# Patient Record
Sex: MALE | Race: WHITE | NOT HISPANIC OR LATINO | Employment: FULL TIME | URBAN - METROPOLITAN AREA
[De-identification: names, ages, dates, MRNs, and addresses within clinical notes are randomized per-mention and may not be internally consistent; named-entity substitution may affect disease eponyms.]

---

## 2017-02-08 ENCOUNTER — HISTORICAL (OUTPATIENT)
Dept: LAB | Facility: HOSPITAL | Age: 47
End: 2017-02-08

## 2018-04-09 ENCOUNTER — HISTORICAL (OUTPATIENT)
Dept: ADMINISTRATIVE | Facility: HOSPITAL | Age: 48
End: 2018-04-09

## 2018-04-09 ENCOUNTER — HISTORICAL (OUTPATIENT)
Dept: LAB | Facility: HOSPITAL | Age: 48
End: 2018-04-09

## 2018-04-09 LAB
ALBUMIN SERPL-MCNC: 4.8 GM/DL (ref 3.4–5)
ALBUMIN/GLOB SERPL: 2.4 {RATIO} (ref 1.5–2.5)
ALP SERPL-CCNC: 62 UNIT/L (ref 38–126)
ALT SERPL-CCNC: 24 UNIT/L (ref 7–52)
APPEARANCE, UA: CLEAR
AST SERPL-CCNC: 29 UNIT/L (ref 15–37)
BACTERIA #/AREA URNS AUTO: NORMAL /HPF
BILIRUB SERPL-MCNC: 0.4 MG/DL (ref 0.2–1)
BILIRUB UR QL STRIP: NEGATIVE MG/DL
BILIRUBIN DIRECT+TOT PNL SERPL-MCNC: 0.1 MG/DL (ref 0–0.5)
BUN SERPL-MCNC: 22 MG/DL (ref 7–18)
CALCIUM SERPL-MCNC: 9.4 MG/DL (ref 8.5–10)
CHLORIDE SERPL-SCNC: 103 MMOL/L (ref 98–107)
CHOLEST SERPL-MCNC: 118 MG/DL (ref 0–200)
CHOLEST/HDLC SERPL: 2.5 {RATIO}
CO2 SERPL-SCNC: 27 MMOL/L (ref 21–32)
COLOR UR: YELLOW
CREAT SERPL-MCNC: 0.99 MG/DL (ref 0.6–1.3)
CREAT/UREA NIT SERPL: 22.2
GGT SERPL-CCNC: 13 UNIT/L (ref 5–85)
GLOBULIN SER-MCNC: 2 GM/DL (ref 1.2–3)
GLUCOSE (UA): NEGATIVE MG/DL
GLUCOSE SERPL-MCNC: 98 MG/DL (ref 74–106)
HDLC SERPL-MCNC: 47 MG/DL (ref 35–60)
HGB UR QL STRIP: NEGATIVE UNIT/L
KETONES UR QL STRIP: NEGATIVE MG/DL
LDH SERPL-CCNC: 155 UNIT/L (ref 140–271)
LDLC SERPL CALC-MCNC: 61 MG/DL (ref 0–129)
LEUKOCYTE ESTERASE UR QL STRIP: NEGATIVE UNIT/L
NITRITE UR QL STRIP.AUTO: NEGATIVE
PH UR STRIP: 6.5 [PH]
POTASSIUM SERPL-SCNC: 4.6 MMOL/L (ref 3.5–5.1)
PROT SERPL-MCNC: 6.8 GM/DL (ref 6.4–8.2)
PROT UR QL STRIP: NEGATIVE MG/DL
RBC #/AREA URNS HPF: NORMAL /HPF
SODIUM SERPL-SCNC: 139 MMOL/L (ref 136–145)
SP GR UR STRIP: 1.01
SQUAMOUS EPITHELIAL, UA: NORMAL /LPF
TESTOST SERPL-MCNC: >1009 NG/DL (ref 300–1060)
TRIGL SERPL-MCNC: 44 MG/DL (ref 30–150)
UROBILINOGEN UR STRIP-ACNC: 0.2 MG/DL
VLDLC SERPL CALC-MCNC: 8.8 MG/DL
WBC #/AREA URNS AUTO: NORMAL /[HPF]

## 2018-10-04 ENCOUNTER — HISTORICAL (OUTPATIENT)
Dept: ADMINISTRATIVE | Facility: HOSPITAL | Age: 48
End: 2018-10-04

## 2018-10-04 LAB
ABS NEUT (OLG): 4.2
ERYTHROCYTE [DISTWIDTH] IN BLOOD BY AUTOMATED COUNT: 12.3 % (ref 11.5–17)
HCT VFR BLD AUTO: 48.3 % (ref 42–52)
HGB BLD-MCNC: 16 GM/DL (ref 14–18)
LYMPHOCYTES # BLD AUTO: 1.5 X10(3)/MCL (ref 0.6–3.4)
LYMPHOCYTES NFR BLD AUTO: 24.1 % (ref 13–40)
MCH RBC QN AUTO: 31.8 PG (ref 27–31.2)
MCHC RBC AUTO-ENTMCNC: 33 GM/DL (ref 32–36)
MCV RBC AUTO: 96 FL (ref 80–94)
MONOCYTES # BLD AUTO: 0.6 X10(3)/MCL (ref 0–1.8)
MONOCYTES NFR BLD AUTO: 9.1 % (ref 0.1–24)
NEUTROPHILS NFR BLD AUTO: 66.8 % (ref 47–80)
PLATELET # BLD AUTO: 167 X10(3)/MCL (ref 130–400)
PMV BLD AUTO: 10.1 FL
PSA SERPL-MCNC: 1.67 NG/ML (ref 0–2.5)
RBC # BLD AUTO: 5.03 X10(6)/MCL (ref 4.7–6.1)
WBC # SPEC AUTO: 6.3 X10(3)/MCL (ref 4.5–11.5)

## 2019-05-09 ENCOUNTER — HISTORICAL (OUTPATIENT)
Dept: LAB | Facility: HOSPITAL | Age: 49
End: 2019-05-09

## 2019-05-09 ENCOUNTER — HISTORICAL (OUTPATIENT)
Dept: ADMINISTRATIVE | Facility: HOSPITAL | Age: 49
End: 2019-05-09

## 2019-05-09 LAB
ABS NEUT (OLG): 2.9 X10(3)/MCL (ref 2.1–9.2)
ALBUMIN SERPL-MCNC: 4.4 GM/DL (ref 3.4–5)
ALBUMIN/GLOB SERPL: 1.83 {RATIO} (ref 1.5–2.5)
ALP SERPL-CCNC: 57 UNIT/L (ref 38–126)
ALT SERPL-CCNC: 26 UNIT/L (ref 7–52)
APPEARANCE, UA: CLEAR
AST SERPL-CCNC: 37 UNIT/L (ref 15–37)
BACTERIA #/AREA URNS AUTO: NORMAL /HPF
BILIRUB SERPL-MCNC: 0.3 MG/DL (ref 0.2–1)
BILIRUB UR QL STRIP: NEGATIVE MG/DL
BILIRUBIN DIRECT+TOT PNL SERPL-MCNC: 0.1 MG/DL (ref 0–0.5)
BILIRUBIN DIRECT+TOT PNL SERPL-MCNC: 0.2 MG/DL
BUN SERPL-MCNC: 21 MG/DL (ref 7–18)
CALCIUM SERPL-MCNC: 8.9 MG/DL (ref 8.5–10)
CHLORIDE SERPL-SCNC: 103 MMOL/L (ref 98–107)
CHOLEST SERPL-MCNC: 116 MG/DL (ref 0–200)
CHOLEST/HDLC SERPL: 2.3 {RATIO}
CO2 SERPL-SCNC: 30 MMOL/L (ref 21–32)
COLOR UR: YELLOW
CREAT SERPL-MCNC: 1 MG/DL (ref 0.6–1.3)
ERYTHROCYTE [DISTWIDTH] IN BLOOD BY AUTOMATED COUNT: 12.5 % (ref 11.5–17)
GLOBULIN SER-MCNC: 2.4 GM/DL (ref 1.2–3)
GLUCOSE (UA): NEGATIVE MG/DL
GLUCOSE SERPL-MCNC: 102 MG/DL (ref 74–106)
HCT VFR BLD AUTO: 42.8 % (ref 42–52)
HDLC SERPL-MCNC: 50 MG/DL (ref 35–60)
HGB BLD-MCNC: 14.8 GM/DL (ref 14–18)
HGB UR QL STRIP: NEGATIVE UNIT/L
KETONES UR QL STRIP: NEGATIVE MG/DL
LDLC SERPL CALC-MCNC: 52 MG/DL (ref 0–129)
LEUKOCYTE ESTERASE UR QL STRIP: NEGATIVE UNIT/L
LYMPHOCYTES # BLD AUTO: 1.2 X10(3)/MCL (ref 0.6–3.4)
LYMPHOCYTES NFR BLD AUTO: 26.9 % (ref 13–40)
MCH RBC QN AUTO: 31.3 PG (ref 27–31.2)
MCHC RBC AUTO-ENTMCNC: 35 GM/DL (ref 32–36)
MCV RBC AUTO: 90 FL (ref 80–94)
MONOCYTES # BLD AUTO: 0.5 X10(3)/MCL (ref 0.1–1.3)
MONOCYTES NFR BLD AUTO: 10.4 % (ref 0.1–24)
NEUTROPHILS NFR BLD AUTO: 62.7 % (ref 47–80)
NITRITE UR QL STRIP.AUTO: NEGATIVE
PH UR STRIP: 7 [PH]
PLATELET # BLD AUTO: 169 X10(3)/MCL (ref 130–400)
PMV BLD AUTO: 8.9 FL (ref 9.4–12.4)
POTASSIUM SERPL-SCNC: 4.6 MMOL/L (ref 3.5–5.1)
PROT SERPL-MCNC: 6.8 GM/DL (ref 6.4–8.2)
PROT UR QL STRIP: NEGATIVE MG/DL
PSA SERPL-MCNC: 1.76 NG/ML (ref 0–2.5)
RBC # BLD AUTO: 4.73 X10(6)/MCL (ref 4.7–6.1)
RBC #/AREA URNS HPF: NORMAL /HPF
SODIUM SERPL-SCNC: 137 MMOL/L (ref 136–145)
SP GR UR STRIP: 1.01
SQUAMOUS EPITHELIAL, UA: NORMAL /LPF
TESTOST SERPL-MCNC: 697 NG/DL (ref 300–1060)
TRIGL SERPL-MCNC: 38 MG/DL (ref 30–150)
UROBILINOGEN UR STRIP-ACNC: 0.2 MG/DL
VLDLC SERPL CALC-MCNC: 7.6 MG/DL
WBC # SPEC AUTO: 4.6 X10(3)/MCL (ref 4.5–11.5)
WBC #/AREA URNS AUTO: NORMAL /[HPF]

## 2020-02-13 ENCOUNTER — HISTORICAL (OUTPATIENT)
Dept: LAB | Facility: HOSPITAL | Age: 50
End: 2020-02-13

## 2020-02-13 ENCOUNTER — HISTORICAL (OUTPATIENT)
Dept: ADMINISTRATIVE | Facility: HOSPITAL | Age: 50
End: 2020-02-13

## 2020-02-13 LAB
ABS NEUT (OLG): 2.6 X10(3)/MCL (ref 2.1–9.2)
ERYTHROCYTE [DISTWIDTH] IN BLOOD BY AUTOMATED COUNT: 12.8 % (ref 11.5–17)
ESTRADIOL SERPL HS-MCNC: 28 PG/ML
HCT VFR BLD AUTO: 46.8 % (ref 42–52)
HGB BLD-MCNC: 15.8 GM/DL (ref 14–18)
LYMPHOCYTES # BLD AUTO: 1.1 X10(3)/MCL (ref 0.6–3.4)
LYMPHOCYTES NFR BLD AUTO: 23.5 % (ref 13–40)
MCH RBC QN AUTO: 30.7 PG (ref 27–31.2)
MCHC RBC AUTO-ENTMCNC: 34 GM/DL (ref 32–36)
MCV RBC AUTO: 91 FL (ref 80–94)
MONOCYTES # BLD AUTO: 0.8 X10(3)/MCL (ref 0.1–1.3)
MONOCYTES NFR BLD AUTO: 17.2 % (ref 0.1–24)
NEUTROPHILS NFR BLD AUTO: 59.3 % (ref 47–80)
PLATELET # BLD AUTO: 192 X10(3)/MCL (ref 130–400)
PMV BLD AUTO: 9.6 FL (ref 9.4–12.4)
PSA SERPL-MCNC: 2.05 NG/ML (ref 0–2.5)
RBC # BLD AUTO: 5.14 X10(6)/MCL (ref 4.7–6.1)
WBC # SPEC AUTO: 4.5 X10(3)/MCL (ref 4.5–11.5)

## 2020-05-21 ENCOUNTER — HISTORICAL (OUTPATIENT)
Dept: ADMINISTRATIVE | Facility: HOSPITAL | Age: 50
End: 2020-05-21

## 2020-05-21 LAB
ABS NEUT (OLG): 2.3 X10(3)/MCL (ref 2.1–9.2)
ALBUMIN SERPL-MCNC: 4.4 GM/DL (ref 3.4–5)
ALBUMIN/GLOB SERPL: 1.83 {RATIO} (ref 1.5–2.5)
ALP SERPL-CCNC: 54 UNIT/L (ref 38–126)
ALT SERPL-CCNC: 32 UNIT/L (ref 7–52)
APPEARANCE, UA: CLEAR
AST SERPL-CCNC: 32 UNIT/L (ref 15–37)
BACTERIA #/AREA URNS AUTO: NORMAL /HPF
BILIRUB SERPL-MCNC: 0.4 MG/DL (ref 0.2–1)
BILIRUB UR QL STRIP: NEGATIVE MG/DL
BILIRUBIN DIRECT+TOT PNL SERPL-MCNC: 0.1 MG/DL (ref 0–0.5)
BILIRUBIN DIRECT+TOT PNL SERPL-MCNC: 0.3 MG/DL
BUN SERPL-MCNC: 23 MG/DL (ref 7–18)
CALCIUM SERPL-MCNC: 9.2 MG/DL (ref 8.5–10)
CHLORIDE SERPL-SCNC: 103 MMOL/L (ref 98–107)
CHOLEST SERPL-MCNC: 136 MG/DL (ref 0–200)
CHOLEST/HDLC SERPL: 4 {RATIO}
CO2 SERPL-SCNC: 29 MMOL/L (ref 21–32)
COLOR UR: YELLOW
CREAT SERPL-MCNC: 1.18 MG/DL (ref 0.6–1.3)
ERYTHROCYTE [DISTWIDTH] IN BLOOD BY AUTOMATED COUNT: 12.4 % (ref 11.5–17)
ESTRADIOL SERPL HS-MCNC: <24 PG/ML
GLOBULIN SER-MCNC: 2.4 GM/DL (ref 1.2–3)
GLUCOSE (UA): NEGATIVE MG/DL
GLUCOSE SERPL-MCNC: 93 MG/DL (ref 74–106)
HCT VFR BLD AUTO: 47.1 % (ref 42–52)
HDLC SERPL-MCNC: 34 MG/DL (ref 35–60)
HGB BLD-MCNC: 15.8 GM/DL (ref 14–18)
HGB UR QL STRIP: NEGATIVE UNIT/L
KETONES UR QL STRIP: NEGATIVE MG/DL
LDLC SERPL CALC-MCNC: 99 MG/DL (ref 0–129)
LEUKOCYTE ESTERASE UR QL STRIP: NEGATIVE UNIT/L
LYMPHOCYTES # BLD AUTO: 1.3 X10(3)/MCL (ref 0.6–3.4)
LYMPHOCYTES NFR BLD AUTO: 31.2 % (ref 13–40)
MCH RBC QN AUTO: 30.2 PG (ref 27–31.2)
MCHC RBC AUTO-ENTMCNC: 34 GM/DL (ref 32–36)
MCV RBC AUTO: 90 FL (ref 80–94)
MONOCYTES # BLD AUTO: 0.6 X10(3)/MCL (ref 0.1–1.3)
MONOCYTES NFR BLD AUTO: 14.7 % (ref 0.1–24)
NEUTROPHILS NFR BLD AUTO: 54.1 % (ref 47–80)
NITRITE UR QL STRIP.AUTO: NEGATIVE
PH UR STRIP: 7.5 [PH]
PLATELET # BLD AUTO: 227 X10(3)/MCL (ref 130–400)
PMV BLD AUTO: 10.1 FL (ref 9.4–12.4)
POTASSIUM SERPL-SCNC: 4.7 MMOL/L (ref 3.5–5.1)
PROT SERPL-MCNC: 6.8 GM/DL (ref 6.4–8.2)
PROT UR QL STRIP: NEGATIVE MG/DL
PSA SERPL-MCNC: 3.03 NG/ML (ref 0–2.5)
RBC # BLD AUTO: 5.24 X10(6)/MCL (ref 4.7–6.1)
RBC #/AREA URNS HPF: NORMAL /HPF
SODIUM SERPL-SCNC: 139 MMOL/L (ref 136–145)
SP GR UR STRIP: 1.02
SQUAMOUS EPITHELIAL, UA: NORMAL /LPF
TESTOST SERPL-MCNC: 223 NG/DL (ref 300–1060)
TRIGL SERPL-MCNC: 60 MG/DL (ref 30–150)
UROBILINOGEN UR STRIP-ACNC: 0.2 MG/DL
VLDLC SERPL CALC-MCNC: 12 MG/DL
WBC # SPEC AUTO: 4.2 X10(3)/MCL (ref 4.5–11.5)
WBC #/AREA URNS AUTO: NORMAL /[HPF]

## 2020-09-28 ENCOUNTER — HISTORICAL (OUTPATIENT)
Dept: ADMINISTRATIVE | Facility: HOSPITAL | Age: 50
End: 2020-09-28

## 2020-09-28 LAB — PSA SERPL-MCNC: 2.15 NG/ML (ref 0–2.5)

## 2021-06-09 ENCOUNTER — HISTORICAL (OUTPATIENT)
Dept: ADMINISTRATIVE | Facility: HOSPITAL | Age: 51
End: 2021-06-09

## 2021-06-09 LAB
ABS NEUT (OLG): 2.4 X10(3)/MCL (ref 2.1–9.2)
ALBUMIN SERPL-MCNC: 4.2 GM/DL (ref 3.4–5)
ALBUMIN/GLOB SERPL: 1.75 {RATIO} (ref 1.5–2.5)
ALP SERPL-CCNC: 54 UNIT/L (ref 38–126)
ALT SERPL-CCNC: 48 UNIT/L (ref 7–52)
APPEARANCE, UA: CLEAR
AST SERPL-CCNC: 58 UNIT/L (ref 15–37)
BACTERIA #/AREA URNS AUTO: NORMAL /HPF
BILIRUB SERPL-MCNC: 0.4 MG/DL (ref 0.2–1)
BILIRUB UR QL STRIP: NEGATIVE MG/DL
BILIRUBIN DIRECT+TOT PNL SERPL-MCNC: 0.1 MG/DL (ref 0–0.5)
BILIRUBIN DIRECT+TOT PNL SERPL-MCNC: 0.3 MG/DL
BUN SERPL-MCNC: 27 MG/DL (ref 7–18)
CALCIUM SERPL-MCNC: 8.9 MG/DL (ref 8.5–10.1)
CHLORIDE SERPL-SCNC: 105 MMOL/L (ref 98–107)
CHOLEST SERPL-MCNC: 118 MG/DL (ref 0–200)
CHOLEST/HDLC SERPL: 2.7 {RATIO}
CO2 SERPL-SCNC: 29 MMOL/L (ref 21–32)
COLOR UR: YELLOW
CREAT SERPL-MCNC: 0.99 MG/DL (ref 0.6–1.3)
ERYTHROCYTE [DISTWIDTH] IN BLOOD BY AUTOMATED COUNT: 12.8 % (ref 11.5–17)
GLOBULIN SER-MCNC: 2.1 GM/DL (ref 1.2–3)
GLUCOSE (UA): NEGATIVE MG/DL
GLUCOSE SERPL-MCNC: 92 MG/DL (ref 74–106)
HCT VFR BLD AUTO: 45.4 % (ref 42–52)
HDLC SERPL-MCNC: 44 MG/DL (ref 35–60)
HGB BLD-MCNC: 15.5 GM/DL (ref 14–18)
HGB UR QL STRIP: NEGATIVE UNIT/L
KETONES UR QL STRIP: NORMAL MG/DL
LDLC SERPL CALC-MCNC: 63 MG/DL (ref 0–129)
LEUKOCYTE ESTERASE UR QL STRIP: NEGATIVE UNIT/L
LYMPHOCYTES # BLD AUTO: 1.4 X10(3)/MCL (ref 0.6–3.4)
LYMPHOCYTES NFR BLD AUTO: 29.5 % (ref 13–40)
MCH RBC QN AUTO: 31.2 PG (ref 27–31.2)
MCHC RBC AUTO-ENTMCNC: 34 GM/DL (ref 32–36)
MCV RBC AUTO: 91 FL (ref 80–94)
MONOCYTES # BLD AUTO: 0.8 X10(3)/MCL (ref 0.1–1.3)
MONOCYTES NFR BLD AUTO: 17.3 % (ref 0.1–24)
NEUTROPHILS NFR BLD AUTO: 53.2 % (ref 47–80)
NITRITE UR QL STRIP.AUTO: NEGATIVE
PH UR STRIP: 6 [PH]
PLATELET # BLD AUTO: 161 X10(3)/MCL (ref 130–400)
PMV BLD AUTO: 10.1 FL (ref 9.4–12.4)
POTASSIUM SERPL-SCNC: 4.9 MMOL/L (ref 3.5–5.1)
PROT SERPL-MCNC: 6.6 GM/DL (ref 6.4–8.2)
PROT UR QL STRIP: NEGATIVE MG/DL
PSA SERPL-MCNC: 2.15 NG/ML (ref 0–2.5)
RBC # BLD AUTO: 4.97 X10(6)/MCL (ref 4.7–6.1)
RBC #/AREA URNS HPF: NORMAL /HPF
SODIUM SERPL-SCNC: 141 MMOL/L (ref 136–145)
SP GR UR STRIP: 1.02
SQUAMOUS EPITHELIAL, UA: NORMAL /LPF
TRIGL SERPL-MCNC: 50 MG/DL (ref 30–150)
UROBILINOGEN UR STRIP-ACNC: 0.2 MG/DL
VLDLC SERPL CALC-MCNC: 10 MG/DL
WBC # SPEC AUTO: 4.6 X10(3)/MCL (ref 4.5–11.5)
WBC #/AREA URNS AUTO: NORMAL /[HPF]

## 2021-08-25 ENCOUNTER — HISTORICAL (OUTPATIENT)
Dept: ADMINISTRATIVE | Facility: HOSPITAL | Age: 51
End: 2021-08-25

## 2021-08-25 LAB
ALBUMIN SERPL-MCNC: 4.6 GM/DL (ref 3.4–5)
ALBUMIN/GLOB SERPL: 2 {RATIO} (ref 1.5–2.5)
ALP SERPL-CCNC: 62 UNIT/L (ref 38–126)
ALT SERPL-CCNC: 24 UNIT/L (ref 7–52)
AST SERPL-CCNC: 29 UNIT/L (ref 15–37)
BILIRUB SERPL-MCNC: 0.4 MG/DL (ref 0.2–1)
BILIRUBIN DIRECT+TOT PNL SERPL-MCNC: 0.1 MG/DL (ref 0–0.5)
BILIRUBIN DIRECT+TOT PNL SERPL-MCNC: 0.3 MG/DL
BUN SERPL-MCNC: 24 MG/DL (ref 7–18)
CALCIUM SERPL-MCNC: 9.5 MG/DL (ref 8.5–10.1)
CHLORIDE SERPL-SCNC: 99 MMOL/L (ref 98–107)
CO2 SERPL-SCNC: 30 MMOL/L (ref 21–32)
CREAT SERPL-MCNC: 1.2 MG/DL (ref 0.6–1.3)
EST CREAT CLEARANCE SER (OHS): 83.42 ML/MIN
ESTRADIOL SERPL HS-MCNC: 78 PG/ML
GLOBULIN SER-MCNC: 2.3 GM/DL (ref 1.2–3)
GLUCOSE SERPL-MCNC: 85 MG/DL (ref 74–106)
POTASSIUM SERPL-SCNC: 4.8 MMOL/L (ref 3.5–5.1)
PROT SERPL-MCNC: 6.9 GM/DL (ref 6.4–8.2)
SODIUM SERPL-SCNC: 137 MMOL/L (ref 136–145)
TESTOST SERPL-MCNC: >1009 NG/DL (ref 300–1060)

## 2021-11-08 ENCOUNTER — HISTORICAL (OUTPATIENT)
Dept: ADMINISTRATIVE | Facility: HOSPITAL | Age: 51
End: 2021-11-08

## 2021-11-08 LAB
ABS NEUT (OLG): 4.5 X10(3)/MCL (ref 2.1–9.2)
ERYTHROCYTE [DISTWIDTH] IN BLOOD BY AUTOMATED COUNT: 12.3 % (ref 11.5–17)
ESTRADIOL SERPL HS-MCNC: <24 PG/ML
HCT VFR BLD AUTO: 46.9 % (ref 42–52)
HGB BLD-MCNC: 15.7 GM/DL (ref 14–18)
LYMPHOCYTES # BLD AUTO: 1.2 X10(3)/MCL (ref 0.6–3.4)
LYMPHOCYTES NFR BLD AUTO: 18.7 % (ref 13–40)
MCH RBC QN AUTO: 30.4 PG (ref 27–31.2)
MCHC RBC AUTO-ENTMCNC: 34 GM/DL (ref 32–36)
MCV RBC AUTO: 91 FL (ref 80–94)
MONOCYTES # BLD AUTO: 0.6 X10(3)/MCL (ref 0.1–1.3)
MONOCYTES NFR BLD AUTO: 9.9 % (ref 0.1–24)
NEUTROPHILS NFR BLD AUTO: 71.4 % (ref 47–80)
PLATELET # BLD AUTO: 217 X10(3)/MCL (ref 130–400)
PMV BLD AUTO: 9.7 FL (ref 9.4–12.4)
PSA SERPL-MCNC: 2.59 NG/ML (ref 0–2.5)
RBC # BLD AUTO: 5.17 X10(6)/MCL (ref 4.7–6.1)
TESTOST SERPL-MCNC: 620 NG/DL (ref 300–1060)
WBC # SPEC AUTO: 6.3 X10(3)/MCL (ref 4.5–11.5)

## 2022-04-09 ENCOUNTER — HISTORICAL (OUTPATIENT)
Dept: ADMINISTRATIVE | Facility: HOSPITAL | Age: 52
End: 2022-04-09

## 2022-04-29 VITALS
SYSTOLIC BLOOD PRESSURE: 110 MMHG | WEIGHT: 178.81 LBS | OXYGEN SATURATION: 98 % | HEIGHT: 66 IN | BODY MASS INDEX: 28.74 KG/M2 | DIASTOLIC BLOOD PRESSURE: 70 MMHG

## 2022-05-02 NOTE — HISTORICAL [facility] CERNER
This is a historical note converted from Karthik. Formatting and pictures may have been removed.  Please reference Karthik for original formatting and attached multimedia. Chief Complaint  Discuss PSA/ prostate issues & last labs. ?3 Month recheck- refill medicaiton  History of Present Illness  Patient presents for routine 3 month FU ADD.?He admits to taking Adderall 20mg TID. He states this medication allows him to concentrate and complete daily tasks.??He denies insomnia, tachycardia, chest pain, palpitations,?decrease in appetite, shortness of breath, anxiety, panic attacks.??He also admits to completing Bactrim x3 weeks last office visit due to an elevated PSA level. ?He denies returning to the clinic?6 weeks after wellness for repeat PSA level.? He denies urine stream changes, dribbling, pain, dysuria, hematuria.  Review of Systems  GENERAL:???no weight?changes, no tics,?no?insomnia  CARDIAC:? no chest pain, no palpations or SOB  ABD: no n/v, no diarrhea  Psych:?no increased anxiety/panic attacks  Physical Exam  Vitals & Measurements  T:?36.6? ?C (Oral)? HR:?83(Peripheral)? BP:?130/80? SpO2:?99%?  HT:?165.00?cm? WT:?78.400?kg? BMI:?28.8?  GENERAL:??Well developed, well nourished in no acute distress,?no significant wt loss  CHEST:? CTA bilaterally  CARDIAC:? RRR no murmurs audible  Assessment/Plan  1.?ADHD (attention deficit hyperactivity disorder), inattentive type?F90.0  Stable/patient doing well on current treatment, will continue to closely monitor,?follow-up in 3 months.? 3 prescriptions printed and hand given to patient at office visit today?for 3-month supply.? Risks/benefits/side effects?of medication discussed with patient. Patient denies any insomnia?or palpitations.?  Ordered:  Clinic Follow up, *Est. 12/28/20 3:00:00 CST, Order for future visit, ADHD (attention deficit hyperactivity disorder), inattentive type  Hypogonadism, [facility]  Office/Outpatient Visit Level 3 Established 60031 PC, ADHD  (attention deficit hyperactivity disorder), inattentive type  Hypogonadism  Elevated PSA, [facility] - [facility], 09/28/20 9:59:00 CDT  ?  2.?Hypogonadism?E23.7  Completed Bactrim x3 weeks--PSA ordered today (did not complete?in June).  Will call patient with results.  Consider urology referral/eval?pending?results--discussed in great detail?about elevated PSA levels.  Ordered:  Clinic Follow up, *Est. 12/28/20 3:00:00 CST, Order for future visit, ADHD (attention deficit hyperactivity disorder), inattentive type  Hypogonadism, [facility]  Lab Collection Request, 09/28/20 10:04:00 CDT, [facility] - [facility], 09/28/20 10:04:00 CDT, Elevated PSA  Hypogonadism  Office/Outpatient Visit Level 3 Established 85012 PC, ADHD (attention deficit hyperactivity disorder), inattentive type  Hypogonadism  Elevated PSA, [facility] - [facility], 09/28/20 9:59:00 CDT  Prostate Specific Antigen, Routine collect, 09/28/20 10:04:00 CDT, Blood, Order for future visit, Stop date 09/28/20 10:04:00 CDT, Lab Collect, Elevated PSA  Hypogonadism, 09/28/20 10:04:00 CDT  ?  3.?Elevated PSA?R97.20  #2.  Ordered:  Lab Collection Request, 09/28/20 10:04:00 CDT, [facility] - [facility], 09/28/20 10:04:00 CDT, Elevated PSA  Hypogonadism  Office/Outpatient Visit Level 3 Established 39431 PC, ADHD (attention deficit hyperactivity disorder), inattentive type  Hypogonadism  Elevated PSA, [facility] - [facility], 09/28/20 9:59:00 CDT  Prostate Specific Antigen, Routine collect, 09/28/20 10:04:00 CDT, Blood, Order for future visit, Stop date 09/28/20 10:04:00 CDT, Lab Collect, Elevated PSA  Hypogonadism, 09/28/20 10:04:00 CDT  ?  Orders:  amphetamine-dextroamphetamine, 20 mg = 1 tab(s), Oral, TID, # 90 tab(s), 0 Refill(s)  amphetamine-dextroamphetamine, 20 mg = 1 tab(s), Oral, TID, # 90 tab(s), 0 Refill(s)  amphetamine-dextroamphetamine, 20 mg = 1 tab(s), Oral, TID, # 90 tab(s), 0 Refill(s)  anastrozole, See Instructions, 1 tab(s) Oral TWICE WEEKLY, # 30 tab(s), 1 Refill(s),  Pharmacy: [id number], 165, cm, Height/Length Dosing, 09/28/20 9:39:00 CDT, 78.4, kg, Weight Dosing, 09/28/20 9:39:00 CDT  testosterone, 200 mg = 1 mL, IM, q7day, # 10 mL, 1 Refill(s), Pharmacy: [id number], 165, cm, Height/Length Dosing, 09/28/20 9:39:00 CDT, 78.4, kg, Weight Dosing, 09/28/20 9:39:00 CDT  Referrals  Clinic Follow up, *Est. 12/28/20 3:00:00 CST, Order for future visit, ADHD (attention deficit hyperactivity disorder), inattentive type  Hypogonadism, [facility]   Problem List/Past Medical History  Ongoing  ADHD (attention deficit hyperactivity disorder), inattentive type  Hypogonadism  Historical  No qualifying data  Medications  Adderall 20 mg oral tablet, 20 mg= 1 tab(s), Oral, TID  Adderall 20 mg oral tablet, 20 mg= 1 tab(s), Oral, TID  Adderall 20 mg oral tablet, 20 mg= 1 tab(s), Oral, TID  Arimidex 1 mg oral tablet, See Instructions, 1 refills  Depo-Testosterone 200 mg/mL intramuscular solution, 200 mg= 1 mL, IM, q7day, 1 refills  Allergies  No Known Allergies  Social History  Abuse/Neglect  No, 05/20/2020  No, 01/31/2020  No, 10/15/2019  No, 07/15/2019  Alcohol  Current, Wine, 1-2 times per month, 05/20/2020  Current, 1-2 times per year, 04/06/2018  Employment/School  Employed, Work/School description: [employer]., 04/09/2018  Exercise  Exercise duration: 60. Exercise frequency: 3-4 times/week. Exercise type: Aerobics, Weight lifting., 05/20/2020  Exercise duration: 30. Exercise frequency: 3-4 times/week. Exercise type: Running, Weight lifting., 04/09/2018  Home/Environment  Lives with Children, Spouse. Living situation: Home/Independent., 05/20/2020  Lives with Children, Spouse. Living situation: Home/Independent., 04/09/2018  Nutrition/Health  PALEO, Good, 05/20/2020  PALEO, Caffeine intake amount: 3-4 CUPS OF COFFEE PER DAY., 04/09/2018  Substance Use  Never, 05/20/2020  Never, 04/09/2018  Tobacco  Never (less than 100 in lifetime), N/A, 05/20/2020  Never  (less than 100 in lifetime), N/A, 01/31/2020  Never (less than 100 in lifetime), N/A, 10/15/2019  Never (less than 100 in lifetime), N/A, 07/15/2019  Never (less than 100 in lifetime), N/A, 05/09/2019  Never (less than 100 in lifetime), N/A, 01/04/2019  Family History  Back problem: Brother.  Immunizations  Vaccine Date Status   diphtheria/pertussis, acel/tetanus ped 2019 Recorded   Health Maintenance  Health Maintenance  ???Pending?(in the next year)  ??? ??OverDue  ??? ? ? ?Alcohol Misuse Screening due??01/02/20??and every 1??year(s)  ??? ??Due?  ??? ? ? ?ADL Screening due??09/28/20??and every 1??year(s)  ??? ? ? ?Aspirin Therapy for CVD Prevention due??09/28/20??and every 1??year(s)  ??? ? ? ?Depression Screening due??09/28/20??and every?  ??? ? ? ?Influenza Vaccine due??09/28/20??and every?  ??? ? ? ?Tetanus Vaccine due??09/28/20??and every 10??year(s)  ??? ??Due In Future?  ??? ? ? ?Obesity Screening not due until??01/01/21??and every 1??year(s)  ???Satisfied?(in the past 1 year)  ??? ??Satisfied?  ??? ? ? ?Blood Pressure Screening on??09/28/20.??Satisfied by Margy Alvarado LPN  ??? ? ? ?Body Mass Index Check on??09/28/20.??Satisfied by Margy Alvarado LPN  ??? ? ? ?Diabetes Screening on??05/21/20.??Satisfied by Hubert Pritchett  ??? ? ? ?Influenza Vaccine on??01/31/20.??Satisfied by Margy Alvarado LPN  ??? ? ? ?Lipid Screening on??05/21/20.??Satisfied by Hubert Pritchett  ??? ? ? ?Obesity Screening on??09/28/20.??Satisfied by Margy Alvarado LPN  ?      Patients condition discussed the development nurse practitioner.?  I have reviewed and agree with plan of care and follow-up.

## 2022-05-02 NOTE — HISTORICAL [facility] CERNER
This is a historical note converted from Karthik. Formatting and pictures may have been removed.  Please reference Karthik for original formatting and attached multimedia. Chief Complaint  ANNUAL WELLNESS PHYSICAL- NPO  History of Present Illness  Patient presents for?a wellness exam.  He has been feeling well.  He has been sleeping well.  His appetite is good.  He is currently has renovations going on in his house.  He does safety and flight operations.  He is  with 2 children; 1 is graduating on Saturday.  He has alcohol on occasion.  He has 3-4 cups of coffee a day.  He exercises 3-4 times a week.  He does not smoke.  His daughter Juli is graduating Saturday.  Review of Systems  Constitutional:?no?weight gain,?no?weight loss,?+?fatigue, ?no?fever, ?no?chills, ?no?weakness, ?no?trouble sleeping  Eyes: ?no?vision loss/changes,?no?glasses or contacts,??no?pain,??no?redness,??no?blurry or double vision,??no?flashing lights,??no?glaucoma,??no?cataracts, had Lasik surgery 3 months ago  Last eye exam:?surgery 3 months ago: Dr Asif  Neck: ?no?lymphadenopathy,??no?thyroid abnormalities,??no?bruits,??no?stiffness  Ears:?no?decreased hearing,??no?tinnitus,??no?earache,??no?drainage?  Nose:?no?congestion,??no?rhinorrhea,??no?epistaxis,??no?sinus pressure  Throat/Oral:?no?sore throat,??no?hoarseness, ?no?dental caries,??no?gum bleeding,??no?oral lesions  Cardiovascular:?no?chest pain, palpations, or tightness,?no?dyspnea with exertion,??no?orthopnea,??no?paroxysmal nocturnal dyspnea  Respiratory:?no?cough,?no?sputum,??no?hemoptysis,??no?dyspnea,??no?wheezing,??no?pleuritic chest pain?  Gastrointestinal:?no?abdominal pain,?no?nausea,?no?vomiting,??no?heartburn,??no?dysphagia or odynophagia,??no?diarrhea,??no?constipation,??no?melena,??no?hematochezia,?no?jaundice  Urinary:?no?frequency,??no?urgency,??no?burning or pain,?no?hematuria,??no?incontinence,??no?hesitancy,??no?incomplete voiding,??no?flank pain,??no?nocturia,  no problems with erections  Musculoskeletal:?no?myalgias,?no?arthralgias,?no?neck pain,??no?back pain,??no?swelling of extremities  Skin:?no?rashes,?no?sores,?no?non-healing wounds  Neurologic:?no?headaches,?no?dizziness/lightheadedness,?no?tremors,?no?paresthesias,??no?seizures,??no?muscle weakness  Psychiatric:?no?depression/sadness,?no?anhedonia,?no?irritability,?no?suicidal ideations,?no?anxiety,?no?panic attacks  Endocrine:?no?hot or cold intolerance,?no?sweating,?no?polyuria,?no?polydipsia,?no?polyphagia  Hematologic:?no?bruising,??no?bleeding disorders?  Physical Exam  Vitals & Measurements  HR:?96(Peripheral)? BP:?100/60?  HT:?165?cm? WT:?74.8?kg? BMI:?27.47?  ?  GENERAL: The patient is a well-developed, well-nourished male in no?apparent distress. He is alert and oriented x 4.  HEENT: Head is normocephalic and atraumatic. Extraocular muscles are intact. Pupils are equal, round, and reactive to light and accommodation. Nares appeared normal. Mouth is well hydrated and without lesions. Mucous membranes are moist. Posterior pharynx clear of any exudate or lesions.  NECK: Supple. No carotid bruits.? No lymphadenopathy or thyromegaly.  LUNGS: Clear to auscultation.  HEART: Regular rate and rhythm without murmurs, rubs or gallops.  ABDOMEN: Soft, nontender, and nondistended.? Positive bowel sounds.? No hepatosplenomegaly was noted.  EXTREMITIES: Without any cyanosis, clubbing, rash, lesions or edema.  NEUROLOGIC: Cranial nerves II through XII are grossly intact.? No motor or sensory deficits.? Cerebellar function intact.  SKIN: No ulceration or induration present.  :? Nl male genitalia, no hernias, testes descended, soft, mildly atrophied. ?NST,??prostate soft, smooth and without nodules.  ?  Assessment/Plan  1.?Wellness examination?Z00.00  ?Patient has been doing well. ?He has no complaints or concerns.  Ordered:  CBC w/ Auto Diff, Routine collect, 05/09/19 11:22:00 CDT, Blood, Order for future visit, Stop  date 05/09/19 11:22:00 CDT, Lab Collect, Wellness examination  Hypogonadism, 05/09/19 11:22:00 CDT  Comprehensive Metabolic Panel, Routine collect, 05/09/19 11:22:00 CDT, Blood, Order for future visit, Stop date 05/09/19 11:22:00 CDT, Lab Collect, Wellness examination, 05/09/19 11:22:00 CDT  Lab Collection Request, 05/09/19 11:22:00 CDT, [facility] - [facility], 05/09/19 11:22:00 CDT  Lipid Panel, Routine collect, 05/09/19 11:22:00 CDT, Blood, Order for future visit, Stop date 05/09/19 11:22:00 CDT, Lab Collect, Wellness examination, 05/09/19 11:22:00 CDT  Preventative Health Care Est 40-64 years 16507 PC, Wellness examination  Hypogonadism  ADD - Attention deficit disorder, [facility] - AFP, 05/09/19 11:23:00 CDT  Urinalysis Complete no reflex, Routine collect, Urine, Order for future visit, 05/09/19 11:23:00 CDT, Stop date 05/09/19 11:23:00 CDT, Nurse collect, Wellness examination  ?  2.?Hypogonadism?E23.7  ?Doing well on injections.? We will check his labs today.  Ordered:  CBC w/ Auto Diff, Routine collect, 05/09/19 11:22:00 CDT, Blood, Order for future visit, Stop date 05/09/19 11:22:00 CDT, Lab Collect, Wellness examination  Hypogonadism, 05/09/19 11:22:00 CDT  Estradiol-[facility] [id number], Routine collect, 05/09/19 11:22:00 CDT, Blood, Order for future visit, Stop date 05/09/19 11:22:00 CDT, Lab Collect, Hypogonadism, 05/09/19 11:22:00 CDT  Lab Collection Request, 05/09/19 11:22:00 CDT, [facility] AM[facility] - AFP, 05/09/19 11:22:00 CDT  Preventative Health Care Est 40-64 years [id number] PC, Wellness examination  Hypogonadism  ADD - Attention deficit disorder, [facility] AM[facility] - AFP, 05/09/19 11:23:00 CDT  Prostate Specific Antigen, Routine collect, 05/09/19 11:23:00 CDT, Blood, Order for future visit, Stop date 05/09/19 11:23:00 CDT, Lab Collect, Hypogonadism, 05/09/19 11:23:00 CDT  Testosterone Level Total, Routine collect, 05/09/19 11:23:00 CDT, Blood, Order for future visit, Stop date 05/09/19 11:23:00 CDT, Lab Collect, Hypogonadism,  05/09/19 11:23:00 CDT  ?  3.?ADD - Attention deficit disorder?F90.0  ?Patient is doing well on his medications; refills x 3 given.  Ordered:  Clinic Follow up, *Est. 08/09/19 3:00:00 CDT, Order for future visit, ADD - Attention deficit disorder, [facility]  Preventative Health Care Est 40-64 years [id number] PC, Wellness examination  Hypogonadism  ADD - Attention deficit disorder, [facility] AM[facility] - AFP, 05/09/19 11:23:00 CDT  ?  Orders:  amphetamine-dextroamphetamine, 20 mg = 1 tab(s), Oral, TID, # 90 tab(s), 0 Refill(s)  amphetamine-dextroamphetamine, 20 mg = 1 tab(s), Oral, TID, # 90 tab(s), 0 Refill(s)  amphetamine-dextroamphetamine, 20 mg = 1 tab(s), Oral, TID, # 90 tab(s), 0 Refill(s)  Referrals  Clinic Follow up, *Est. 08/09/19 3:00:00 CDT, Order for future visit, ADD - Attention deficit disorder, [facility]   Problem List/Past Medical History  Ongoing  ADD - Attention deficit disorder  Hypogonadism  Influenza vaccine refused  Historical  No qualifying data  Medications  Adderall 20 mg oral tablet, 20 mg= 1 tab(s), Oral, TID  Adderall 20 mg oral tablet, 20 mg= 1 tab(s), Oral, TID  Adderall 20 mg oral tablet, 20 mg= 1 tab(s), Oral, TID  Arimidex 1 mg oral tablet, See Instructions, 1 refills  Depo-Testosterone 200 mg/mL intramuscular solution, 200 mg= 1 mL, IM, q7day, 1 refills  Allergies  No Known Allergies  Social History  Alcohol  Current, 1-2 times per year, 04/06/2018  Employment/School  Employed, Work/School description: [employer]., 04/09/2018  Exercise  Exercise duration: 30. Exercise frequency: 3-4 times/week. Exercise type: Running, Weight lifting., 04/09/2018  Home/Environment  Lives with Children, Spouse. Living situation: Home/Independent., 04/09/2018  Nutrition/Health  PALEO, Caffeine intake amount: 3-4 CUPS OF COFFEE PER DAY., 04/09/2018  Substance Abuse  Never, 04/09/2018  Tobacco  Never (less than 100 in lifetime), N/A, 05/09/2019  Never (less than 100 in lifetime), N/A,  01/04/2019  Family History  Back problem: Brother.  Health Maintenance  Health Maintenance  ???Pending?(in the next year)  ??? ??OverDue  ??? ? ? ?Diabetes Screening due??and every?  ??? ? ? ?Alcohol Misuse Screening due??01/01/19??and every 1??year(s)  ??? ??Due?  ??? ? ? ?ADL Screening due??05/09/19??and every 1??year(s)  ??? ? ? ?Aspirin Therapy for CVD Prevention due??05/09/19??and every 1??year(s)  ??? ? ? ?Depression Screening due??05/09/19??and every?  ??? ? ? ?Tetanus Vaccine due??05/09/19??and every 10??year(s)  ??? ??Due In Future?  ??? ? ? ?Obesity Screening not due until??01/01/20??and every 1??year(s)  ??? ? ? ?Blood Pressure Screening not due until??05/08/20??and every 1??year(s)  ??? ? ? ?Body Mass Index Check not due until??05/08/20??and every 1??year(s)  ???Satisfied?(in the past 1 year)  ??? ??Satisfied?  ??? ? ? ?Blood Pressure Screening on??05/09/19.??Satisfied by Margy Alvarado LPN  ??? ? ? ?Body Mass Index Check on??05/09/19.??Satisfied by Margy Alvarado LPN  ??? ? ? ?Influenza Vaccine on??01/04/19.??Satisfied by Margy Alvarado LPN  ??? ? ? ?Obesity Screening on??05/09/19.??Satisfied by Margy Alvarado LPN  ?  ?

## 2022-05-02 NOTE — HISTORICAL [facility]ERNER
This is a historical note converted from Karthik. Formatting and pictures may have been removed.  Please reference Karthik for original formatting and attached multimedia. Chief Complaint  REFILL MEDS  History of Present Illness  Patient presents for routine 3 month FU.?He admits to taking Adderall 20mg TID. He states this medication allows him to concentrate and complete daily tasks.??He denies insomnia, tachycardia, chest pain, palpitations,?decrease in appetite, shortness of breath, anxiety, panic attacks.? Narcotic agreement updated 3/2021.? He is also taking testosterone cypionate injections 1?ml once weekly?and working well.? His Arimidex was increased to 3 times weekly due to an elevated estrogen at his wellness 2 months ago.? He has no complaints or concerns today.  Review of Systems  GENERAL:???no weight?changes, no tics,?no?insomnia  CARDIAC:? no chest pain, no palpations or SOB  ABD: no n/v, no diarrhea  Psych:?no increased anxiety/panic attacks  Physical Exam  Vitals & Measurements  T:?36.8? ?C (Oral)? HR:?84(Peripheral)? BP:?122/74? SpO2:?97%?  HT:?167.64?cm? HT:?167.64?cm? WT:?79.200?kg? WT:?79.2?kg? BMI:?28.18?  GENERAL:??Well developed, well nourished in no acute distress,?no significant wt loss  CHEST:? CTA bilaterally  CARDIAC:? RRR no murmurs audible  Assessment/Plan  1.?ADHD (attention deficit hyperactivity disorder), inattentive type?F90.0  Stable/patient doing well on current treatment, will continue to closely monitor,?follow-up in 3 months.? 3 prescriptions printed and hand given to patient at office visit today?for 3-month supply.? Risks/benefits/side effects?of medication discussed with patient. ?Patient denies any insomnia?or palpitations.?  Ordered:  Clinic Follow up, *Est. 11/25/21 3:00:00 CST, Order for future visit, ADHD (attention deficit hyperactivity disorder), inattentive type  Hypogonadism, [facility]Link AFP  Comprehensive Metabolic Panel, Routine collect, 08/25/21 9:43:00 CDT, Blood,  Order for future visit, Stop date 08/25/21 9:43:00 CDT, Lab Collect, Elevated liver enzymes  Hypogonadism  ADHD (attention deficit hyperactivity disorder), inattentive type, 08/25/21 9:43:00 CDT  Estradiol Level, Routine collect, 08/25/21 9:43:00 CDT, Blood, Order for future visit, Stop date 08/25/21 9:43:00 CDT, Lab Collect, Hypogonadism  Elevated liver enzymes  ADHD (attention deficit hyperactivity disorder), inattentive type, 08/25/21 9:43:00 CDT  Lab Collection Request, 08/25/21 9:43:00 CDT, [facility]LINK AMB - AFP, 08/25/21 9:43:00 CDT, ADHD (attention deficit hyperactivity disorder), inattentive type  Hypogonadism  Elevated liver enzymes  Office/Outpatient Visit Level 3 Established [id number] PC, ADHD (attention deficit hyperactivity disorder), inattentive type  Hypogonadism  Elevated liver enzymes, [facility] AMB - AFP, 08/25/21 9:43:00 CDT  Testosterone Level Total, Routine collect, 08/25/21 9:43:00 CDT, Blood, Order for future visit, Stop date 08/25/21 9:43:00 CDT, Lab Collect, Hypogonadism  Elevated liver enzymes  ADHD (attention deficit hyperactivity disorder), inattentive type, 08/25/21 9:43:00 CDT  ?  2.?Hypogonadism?E23.7  Repeat estrogen/testosterone today.  CBC/PSA good 6/2021.  On testosterone cyp 1ml once weekly.  Last injection: 2 days ago.  Will continue to monitor and FU.?  Ordered:  Clinic Follow up, *Est. 11/25/21 3:00:00 CST, Order for future visit, ADHD (attention deficit hyperactivity disorder), inattentive type  Hypogonadism, [facility] AFP  Comprehensive Metabolic Panel, Routine collect, 08/25/21 9:43:00 CDT, Blood, Order for future visit, Stop date 08/25/21 9:43:00 CDT, Lab Collect, Elevated liver enzymes  Hypogonadism  ADHD (attention deficit hyperactivity disorder), inattentive type, 08/25/21 9:43:00 CDT  Estradiol Level, Routine collect, 08/25/21 9:43:00 CDT, Blood, Order for future visit, Stop date 08/25/21 9:43:00 CDT, Lab Collect, Hypogonadism  Elevated liver enzymes  ADHD (attention  deficit hyperactivity disorder), inattentive type, 08/25/21 9:43:00 CDT  Lab Collection Request, 08/25/21 9:43:00 CDT, HLINK AMB - AFP, 08/25/21 9:43:00 CDT, ADHD (attention deficit hyperactivity disorder), inattentive type  Hypogonadism  Elevated liver enzymes  Office/Outpatient Visit Level 3 Established [id number] PC, ADHD (attention deficit hyperactivity disorder), inattentive type  Hypogonadism  Elevated liver enzymes, [facility]LINK AMB - AFP, 08/25/21 9:43:00 CDT  Testosterone Level Total, Routine collect, 08/25/21 9:43:00 CDT, Blood, Order for future visit, Stop date 08/25/21 9:43:00 CDT, Lab Collect, Hypogonadism  Elevated liver enzymes  ADHD (attention deficit hyperactivity disorder), inattentive type, 08/25/21 9:43:00 CDT  ?  3.?Elevated liver enzymes?R74.8  Repeat CMP today.  Avoid tylenol/alcohol.  Ordered:  Comprehensive Metabolic Panel, Routine collect, 08/25/21 9:43:00 CDT, Blood, Order for future visit, Stop date 08/25/21 9:43:00 CDT, Lab Collect, Elevated liver enzymes  Hypogonadism  ADHD (attention deficit hyperactivity disorder), inattentive type, 08/25/21 9:43:00 CDT  Estradiol Level, Routine collect, 08/25/21 9:43:00 CDT, Blood, Order for future visit, Stop date 08/25/21 9:43:00 CDT, Lab Collect, Hypogonadism  Elevated liver enzymes  ADHD (attention deficit hyperactivity disorder), inattentive type, 08/25/21 9:43:00 CDT  Lab Collection Request, 08/25/21 9:43:00 CDT, HLINK AMB - AFP, 08/25/21 9:43:00 CDT, ADHD (attention deficit hyperactivity disorder), inattentive type  Hypogonadism  Elevated liver enzymes  Office/Outpatient Visit Level 3 Established [id number] PC, ADHD (attention deficit hyperactivity disorder), inattentive type  Hypogonadism  Elevated liver enzymes, [facility]LINK AMB - AFP, 08/25/21 9:43:00 CDT  Testosterone Level Total, Routine collect, 08/25/21 9:43:00 CDT, Blood, Order for future visit, Stop date 08/25/21 9:43:00 CDT, Lab Collect, Hypogonadism  Elevated liver enzymes  ADHD (attention  deficit hyperactivity disorder), inattentive type, 08/25/21 9:43:00 CDT  ?  Orders:  amphetamine-dextroamphetamine, 20 mg = 1 tab(s), Oral, TID, # 90 tab(s), 0 Refill(s)  amphetamine-dextroamphetamine, 20 mg = 1 tab(s), Oral, TID, # 90 tab(s), 0 Refill(s)  amphetamine-dextroamphetamine, 20 mg = 1 tab(s), Oral, TID, # 90 tab(s), 0 Refill(s)  anastrozole, See Instructions, 1 tab(s) Oral TWICE WEEKLY, # 30 tab(s), 1 Refill(s), Pharmacy: [id number], 167.64, cm, Height/Length Dosing, 08/25/21 9:31:00 CDT, 79.2, kg, Weight Dosing, 08/25/21 9:31:00 CDT  testosterone, 200 mg = 1 mL, IM, q7day, # 10 mL, 1 Refill(s), Pharmacy: [id number], 167.64, cm, Height/Length Dosing, 08/25/21 9:31:00 CDT, 79.2, kg, Weight Dosing, 08/25/21 9:31:00 CDT  Referrals  Clinic Follow up, *Est. 11/25/21 3:00:00 CST, Order for future visit, ADHD (attention deficit hyperactivity disorder), inattentive type  Hypogonadism, HLink AFP   Problem List/Past Medical History  Ongoing  ADHD (attention deficit hyperactivity disorder), inattentive type  Hypogonadism  Historical  No qualifying data  Medications  Adderall 20 mg oral tablet, 20 mg= 1 tab(s), Oral, TID  Adderall 20 mg oral tablet, 20 mg= 1 tab(s), Oral, TID  Adderall 20 mg oral tablet, 20 mg= 1 tab(s), Oral, TID  Arimidex 1 mg oral tablet, See Instructions, 1 refills  Depo-Testosterone 200 mg/mL intramuscular solution, 200 mg= 1 mL, IM, q7day, 1 refills  Allergies  No Known Allergies  Social History  Abuse/Neglect  No, 08/25/2021  No, 06/07/2021  No, 03/16/2021  No, 12/16/2020  No, 05/20/2020  No, 01/31/2020  No, 10/15/2019  No, 07/15/2019  Alcohol  Current, Wine, Liquor, 1-2 times per year, 06/07/2021  Current, Wine, 1-2 times per month, 05/20/2020  Current, 1-2 times per year, 04/06/2018  Employment/School  Employed, Work/School description: SAFETY AND ., 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 Exercise frequency: 3-4 times/week., 06/07/2021  Exercise duration: 60. Exercise frequency: 3-4 times/week. Exercise type: Aerobics, Weight lifting., 05/20/2020  Exercise duration: 30. Exercise frequency: 3-4 times/week. Exercise type: Running, Weight lifting., 04/09/2018  Home/Environment  Lives with Children, Spouse. Living situation: Home/Independent., 06/07/2021  Lives with Children, Spouse. Living situation: Home/Independent., 05/20/2020  Lives with Children, Spouse. Living situation: Home/Independent., 04/09/2018  Nutrition/Health  PALEO, Good, 06/07/2021  PALEO, Good, 05/20/2020  PALEO, Caffeine intake amount: 3-4 CUPS OF COFFEE PER DAY., 04/09/2018  Substance Use  Never, 06/07/2021  Never, 05/20/2020  Never, 04/09/2018  Tobacco  Never (less than 100 in lifetime), N/A, 08/25/2021  Never (less than 100 in lifetime), N/A, 06/07/2021  Never (less than 100 in lifetime), N/A, 03/16/2021  Never (less than 100 in lifetime), N/A, 12/16/2020  Never (less than 100 in lifetime), N/A, 05/20/2020  Never (less than 100 in lifetime), N/A, 01/31/2020  Never (less than 100 in lifetime), N/A, 10/15/2019  Never (less than 100 in lifetime), N/A, 07/15/2019  Never (less than 100 in lifetime), N/A, 05/09/2019  Never (less than 100 in lifetime), N/A, 01/04/2019  Family History  Back problem: Brother.  Immunizations  Vaccine Date Status   COVID-19, vector nr, [id number] - Keyla 08/11/2021 Recorded   diphtheria/pertussis, acel/tetanus ped 2019 Recorded   Health Maintenance  Health Maintenance  ???Pending?(in the next year)  ??? ??OverDue  ??? ? ? ?Alcohol Misuse Screening due??01/02/21??and every 1??year(s)  ??? ??Due?  ??? ? ? ?ADL Screening due??08/25/21??and every 1??year(s)  ??? ? ? ?Aspirin Therapy for CVD Prevention due??08/25/21??and every 1??year(s)  ??? ? ? ?Depression Screening due??08/25/21??Unknown Frequency  ??? ? ? ?Tetanus Vaccine due??08/25/21??and every 10??year(s)  ???  ??Due In Future?  ??? ? ? ?Obesity Screening not due until??01/01/22??and every 1??year(s)  ???Satisfied?(in the past 1 year)  ??? ??Satisfied?  ??? ? ? ?Blood Pressure Screening on??08/25/21.??Satisfied by Sourav Short LPN  ??? ? ? ?Body Mass Index Check on??08/25/21.??Satisfied by Sourav Short LPN  ??? ? ? ?Diabetes Screening on??06/09/21.??Satisfied by Hubert Pritchett  ??? ? ? ?Influenza Vaccine on??03/16/21.??Satisfied by Sourav Short LPN  ??? ? ? ?Lipid Screening on??06/09/21.??Satisfied by Hubert Pritchett  ??? ? ? ?Obesity Screening on??08/25/21.??Satisfied by Sourav Short LPN  ?      Patients condition discussed in detail with nurse practitioner.?  I have reviewed and agree with plan of care and follow-up.

## 2022-05-02 NOTE — HISTORICAL [facility]ERN[facility]
This is a historical note converted from Karthik. Formatting and pictures may have been removed.  Please reference Karthik for original formatting and attached multimedia. Chief Complaint  REFILL MEDS  History of Present Illness  Patient presents for routine 3 month FU.?He admits to taking Adderall 20mg TID. He states this medication allows him to concentrate and complete daily tasks.??He denies insomnia, tachycardia, chest pain, palpitations,?decrease in appetite, shortness of breath, anxiety, panic attacks.? Narcotic agreement updated 3/2021.? He is also taking testosterone cypionate injections 1?ml once weekly?and working well.? Last injection 1.5 weeks ago.? His Arimidex was increased to?daily Mon-Fri?due to an elevated estrogen at his wellness?3 months ago.? He has no complaints or concerns today.? He is doing 28 day hitches.  Review of Systems  GENERAL: No weight loss, no?weight gain, no fever, no fatigue, no chills, no night sweats  VISION: No vision changes, no blurry vision, no double vision  CARDIAC: No chest pain, no palpitations, no dyspnea on exertion, no orthopnea  RESPIRATORY: No cough, no wheezing, no sputum production, no?SOB  EXTREMITIES: No weakness, no edema  NEURO: No HA, no numbness, no tingling, no weakness, no dizziness  ?  Physical Exam  Vitals & Measurements  T:?36.6? ?C (Oral)? HR:?81(Peripheral)? BP:?110/70? SpO2:?98%?  HT:?167.64?cm? HT:?167.64?cm? WT:?81.1?kg? WT:?81.100?kg? BMI:?28.86?  General: Well developed, well nourished in no apparent distress, alert and oriented x3  Chest:?CTA?bilaterally, no wheezes crackles or rubs  Cardiac: RRR,?no murmurs, rubs, gallops  Extremities:?No clubbing, cyanosis, or edema.? Joints WNL, +2 DP/PT pulses bilaterally  ?  Assessment/Plan  1.?ADHD (attention deficit hyperactivity disorder), inattentive type?F90.0  Stable/patient doing well on current treatment, will continue to closely monitor,?follow-up in 3 months.? 3 prescriptions printed and hand given  to patient at office visit today?for 3-month supply.? Risks/benefits/side effects?of medication discussed with patient. ?Patient denies any insomnia?or palpitations.?  Ordered:  CBC w/ Auto Diff, Routine collect, 11/08/21 10:32:00 CST, Blood, Order for future visit, Stop date 11/08/21 10:32:00 CST, Lab Collect, Hypogonadism  ADHD (attention deficit hyperactivity disorder), inattentive type, 11/08/21 10:32:00 CST  Clinic Follow up, *Est. 02/08/22 3:00:00 CST, Order for future visit, ADHD (attention deficit hyperactivity disorder), inattentive type  Hypogonadism, HLink AFP  Estradiol Level, Routine collect, 11/08/21 10:33:00 CST, Blood, Order for future visit, Stop date 11/08/21 10:33:00 CST, Lab Collect, Hypogonadism  ADHD (attention deficit hyperactivity disorder), inattentive type, 11/08/21 10:33:00 CST  Lab Collection Request, 11/08/21 10:32:00 CST, HLINK AMB - AFP, 11/08/21 10:32:00 CST, ADHD (attention deficit hyperactivity disorder), inattentive type  Hypogonadism  Office/Outpatient Visit Level 3 Established [id number] PC, ADHD (attention deficit hyperactivity disorder), inattentive type  Hypogonadism, HLINK AMB - AFP, 11/08/21 10:33:00 CST  Prostate Specific Antigen, Routine collect, 11/08/21 10:32:00 CST, Blood, Order for future visit, Stop date 11/08/21 10:32:00 CST, Lab Collect, Hypogonadism  ADHD (attention deficit hyperactivity disorder), inattentive type, 11/08/21 10:32:00 CST  Testosterone Level Total, Routine collect, 11/08/21 10:32:00 CST, Blood, Order for future visit, Stop date 11/08/21 10:32:00 CST, Lab Collect, Hypogonadism  ADHD (attention deficit hyperactivity disorder), inattentive type, 11/08/21 10:32:00 CST  ?  2.?Hypogonadism?E23.7  Estrogen, CBC, PSA, testosterone?today.  On testosterone cyp 1ml once weekly.  Last injection:?1.5?weeks ago.  Will continue to monitor and FU.?  Ordered:  CBC w/ Auto Diff, Routine collect, 11/08/21 10:32:00 CST, Blood, Order for future visit, Stop date  11/08/21 10:32:00 CST, Lab Collect, Hypogonadism  ADHD (attention deficit hyperactivity disorder), inattentive type, 11/08/21 10:32:00 CST  Clinic Follow up, *Est. 02/08/22 3:00:00 CST, Order for future visit, ADHD (attention deficit hyperactivity disorder), inattentive type  Hypogonadism, HLink AFP  Estradiol Level, Routine collect, 11/08/21 10:33:00 CST, Blood, Order for future visit, Stop date 11/08/21 10:33:00 CST, Lab Collect, Hypogonadism  ADHD (attention deficit hyperactivity disorder), inattentive type, 11/08/21 10:33:00 CST  Lab Collection Request, 11/08/21 10:32:00 CST, HLINK AMB - AFP, 11/08/21 10:32:00 CST, ADHD (attention deficit hyperactivity disorder), inattentive type  Hypogonadism  Office/Outpatient Visit Level 3 Established [id number] PC, ADHD (attention deficit hyperactivity disorder), inattentive type  Hypogonadism, HLINK AMB - AFP, 11/08/21 10:33:00 CST  Prostate Specific Antigen, Routine collect, 11/08/21 10:32:00 CST, Blood, Order for future visit, Stop date 11/08/21 10:32:00 CST, Lab Collect, Hypogonadism  ADHD (attention deficit hyperactivity disorder), inattentive type, 11/08/21 10:32:00 CST  Testosterone Level Total, Routine collect, 11/08/21 10:32:00 CST, Blood, Order for future visit, Stop date 11/08/21 10:32:00 CST, Lab Collect, Hypogonadism  ADHD (attention deficit hyperactivity disorder), inattentive type, 11/08/21 10:32:00 CST  ?  Orders:  amphetamine-dextroamphetamine, 20 mg = 1 tab(s), Oral, TID, # 90 tab(s), 0 Refill(s)  amphetamine-dextroamphetamine, 20 mg = 1 tab(s), Oral, TID, # 90 tab(s), 0 Refill(s)  amphetamine-dextroamphetamine, 20 mg = 1 tab(s), Oral, TID, # 90 tab(s), 0 Refill(s)  anastrozole, See Instructions, 1 tab(s) Oral MONDAY THROUGH FRIDAY FOR 3 MONTHS, # 60 tab(s), 2 Refill(s), Pharmacy: [id number], 167.64, cm, Height/Length Dosing, 11/08/21 10:13:00 CST, 81.1, kg, Weight Dosing, 11/08/21 10:13:00 CST  testosterone, 200 mg = 1 mL, IM, q7day, # 10 mL,  1 Refill(s), Pharmacy: [id number], 167.64, cm, Height/Length Dosing, 11/08/21 10:13:00 CST, 81.1, kg, Weight Dosing, 11/08/21 10:13:00 CST  Declines flu vaccine today.  Referrals  Clinic Follow up, *Est. 02/08/22 3:00:00 CST, Order for future visit, ADHD (attention deficit hyperactivity disorder), inattentive type  Hypogonadism, HLink AFP   Problem List/Past Medical History  Ongoing  ADHD (attention deficit hyperactivity disorder), inattentive type  Hypogonadism  Historical  No qualifying data  Medications  Adderall 20 mg oral tablet, 20 mg= 1 tab(s), Oral, TID  Adderall 20 mg oral tablet, 20 mg= 1 tab(s), Oral, TID  Adderall 20 mg oral tablet, 20 mg= 1 tab(s), Oral, TID  Arimidex 1 mg oral tablet, See Instructions, 2 refills  Depo-Testosterone 200 mg/mL intramuscular solution, 200 mg= 1 mL, IM, q7day, 1 refills  Allergies  No Known Allergies  Social History  Abuse/Neglect  No, 11/08/2021  No, 08/25/2021  No, 06/07/2021  No, 03/16/2021  No, 12/16/2020  No, 05/20/2020  No, 01/31/2020  No, 10/15/2019  No, 07/15/2019  Alcohol  Current, Wine, Liquor, 1-2 times per year, 06/07/2021  Current, Wine, 1-2 times per month, 05/20/2020  Current, 1-2 times per year, 04/06/2018  Employment/School  Employed, Work/School description: SAFETY AND ., 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 Exercise frequency: 3-4 times/week., 06/07/2021  Exercise duration: 60. Exercise frequency: 3-4 times/week. Exercise type: Aerobics, Weight lifting., 05/20/2020  Exercise duration: 30. Exercise frequency: 3-4 times/week. Exercise type: Running, Weight lifting., 04/09/2018  Home/Environment  Lives with Children, Spouse. Living situation: Home/Independent., 06/07/2021  Lives with Children, Spouse. Living situation: Home/Independent., 05/20/2020  Lives with Children, Spouse. Living situation: Home/Independent.,  04/09/2018  Nutrition/Health  PALEO, Good, 06/07/2021  PALEO, Good, 05/20/2020  PALEO, Caffeine intake amount: 3-4 CUPS OF COFFEE PER DAY., 04/09/2018  Substance Use  Never, 06/07/2021  Never, 05/20/2020  Never, 04/09/2018  Tobacco  Never (less than 100 in lifetime), N/A, 11/08/2021  Never (less than 100 in lifetime), N/A, 08/25/2021  Never (less than 100 in lifetime), N/A, 06/07/2021  Never (less than 100 in lifetime), N/A, 03/16/2021  Never (less than 100 in lifetime), N/A, 12/16/2020  Never (less than 100 in lifetime), N/A, 05/20/2020  Never (less than 100 in lifetime), N/A, 01/31/2020  Never (less than 100 in lifetime), N/A, 10/15/2019  Never (less than 100 in lifetime), N/A, 07/15/2019  Never (less than 100 in lifetime), N/A, 05/09/2019  Never (less than 100 in lifetime), N/A, 01/04/2019  Family History  Back problem: Brother.  Immunizations  Vaccine Date Status   COVID-19, vector nr, [id number] - Keyla 08/11/2021 Recorded   diphtheria/pertussis, acel/tetanus ped 2019 Recorded   Health Maintenance  Health Maintenance  ???Pending?(in the next year)  ??? ??OverDue  ??? ? ? ?Alcohol Misuse Screening due??01/02/21??and every 1??year(s)  ??? ??Due?  ??? ? ? ?ADL Screening due??11/08/21??and every 1??year(s)  ??? ? ? ?Aspirin Therapy for CVD Prevention due??11/08/21??and every 1??year(s)  ??? ? ? ?Depression Screening due??11/08/21??Unknown Frequency  ??? ? ? ?Tetanus Vaccine due??11/08/21??and every 10??year(s)  ??? ??Due In Future?  ??? ? ? ?Obesity Screening not due until??01/01/22??and every 1??year(s)  ???Satisfied?(in the past 1 year)  ??? ??Satisfied?  ??? ? ? ?Blood Pressure Screening on??11/08/21.??Satisfied by Sourav Short LPN  ??? ? ? ?Body Mass Index Check on??11/08/21.??Satisfied by Sourav Short LPN  ??? ? ? ?Diabetes Screening on??08/25/21.??Satisfied by Hubert Pritchett  ??? ? ? ?Influenza Vaccine on??11/08/21.??Satisfied by Sourav Short LPN  ??? ? ? ?Lipid Screening on??06/09/21.??Satisfied by  Hubert Pritchett  ??? ? ? ?Obesity Screening on??11/08/21.??Satisfied by Sourav Short LPN  ?      Patients condition discussed in detail with nurse practitioner.?  I have reviewed and agree with plan of care and follow-up.

## 2022-05-02 NOTE — HISTORICAL [facility] C[facility]
This is a historical note converted from Karthik. Formatting and pictures may have been removed.  Please reference Karthik for original formatting and attached multimedia. Chief Complaint  ANNUAL WELLNESS PHYSICAL- NON-FASTING  History of Present Illness  See ROS.  Review of Systems  Constitutional:?no?weight gain,?no?weight loss,?no?fatigue, ?no?fever, ?no?chills, ?no?weakness, ?no?trouble sleeping  Eyes: ?no?vision loss/changes,?+?glasses; progressives,??no?pain,??no?redness,??no?blurry or double vision,??no?flashing lights,??no?glaucoma,??no?cataracts  Last eye exam:?last checked last year  Neck: ?no?lymphadenopathy,??no?thyroid abnormalities,??no?bruits,??no?stiffness  Ears:?no?decreased hearing,??no?tinnitus,??no?earache,??no?drainage?  Nose:?no?congestion,??no?rhinorrhea,??no?epistaxis,??no?sinus pressure  Throat/Oral:?no?sore throat,??no?hoarseness, ?no?dental caries,??no?gum bleeding,??no?oral lesions  Cardiovascular:?no?chest pain, palpations, or tightness,?no?dyspnea with exertion,??no?orthopnea,??no?paroxysmal nocturnal dyspnea  Respiratory:?no?cough,?no?sputum,??no?hemoptysis,??no?dyspnea,??no?wheezing,??no?pleuritic chest pain?  Gastrointestinal:?no?abd pain,?no?nausea,?no?vomiting,??no?heartburn,??no?dysphagia or odynophagia,??no?diarrhea,??no?constipation,??no?melena,??no?hematochezia,?no?jaundice  Urinary:?no?frequency,??no?urgency,??no?burning or pain,?no?hematuria,??no?incontinence,??no?hesitancy,??no?incomplete voiding,??no?flank pain,??no?nocturia, no erectile dysfunction  Musculoskeletal:?no?myalgias,?no?arthralgias,?no?neck pain,??no?back pain,??no?swelling of extremities  Skin:?no?rashes,?no?sores,?no?non-healing wounds  Neurologic:?no?headaches,?no?dizziness/lightheadedness,?no?tremors,?no?paresthesias,??no?seizures,??no?muscle weakness  Psychiatric:?no?depression/sadness,?no?anhedonia,?no?irritability,?no?suicidal ideations,?no?anxiety,?no?panic attacks  Endocrine:?no?hot or cold  intolerance,?no?sweating,?no?polyuria,?no?polydipsia,?no?polyphagia  Hematologic:?no?bruising,??no?bleeding disorders?  ?   Sleeps well.  Appetite is good.  + Exercise 4 x wk.  Physical Exam  Vitals & Measurements  HR:?72(Peripheral)? BP:?100/70?  HT:?165.1?cm? HT:?165.1?cm? WT:?73.9?kg? WT:?73.9?kg? BMI:?27.11?  ?  GENERAL: The patient is a well-developed, well-nourished male in no?apparent distress. He is alert and oriented x 4.  HEENT: Head is normocephalic and atraumatic. Extraocular muscles are intact. Pupils are equal, round, and reactive to light and accommodation. Nares appeared normal. Mouth is well hydrated and without lesions. Mucous membranes are moist. Posterior pharynx clear of any exudate or lesions.  NECK: Supple. No carotid bruits.? No lymphadenopathy or thyromegaly.  LUNGS: Clear to auscultation.? Chest wall: small swollen?tender nodules subareolar areas bilaterally  HEART: Regular rate and rhythm without murmur.  ABDOMEN: Soft, nontender, and nondistended.? Positive bowel sounds.? No hepatosplenomegaly was noted.  EXTREMITIES: Without any cyanosis, clubbing, rash, lesions or edema.  NEUROLOGIC: Cranial nerves II through XII are grossly intact.? No motor or sensory deficits.? Cerebellar function intact.  SKIN: No ulceration or induration present.  :? Nl male genitalia, no hernias,??NST,??prostate soft, smooth and without nodules. Testes: descended soft and atrophied.  ?  Assessment/Plan  1.?Wellness examination  Ordered:  Clinic Follow up, *Est. 07/09/18 3:00:00 CDT, Order for future visit, Wellness examination, [facility]  CMP, Routine collect, 04/09/18 11:18:00 CDT, Blood, Stop date 04/09/18 11:18:00 CDT, Lab Collect, Wellness examination, 04/09/18 11:18:00 CDT  Lipid Panel, Routine collect, 04/09/18 11:18:00 CDT, Blood, Stop date 04/09/18 11:18:00 CDT, Lab Collect, Wellness examination, 04/09/18 11:18:00 CDT  Preventative Health Care New 40-64 years [id number] PC, Wellness examination, [facility]  [facility], 04/09/18 11:13:00 CDT  Urinalysis Complete no reflex, Routine collect, Urine, 04/09/18 11:18:00 CDT, Stop date 04/09/18 11:18:00 CDT, Nurse collect, Wellness examination  ?  2.?ADD - Attention deficit disorder  ?Doing well. Rxs x 3 given.  ?  3.?Hypogonadism  Doing well. Meds refilled.  Ordered:  Estradiol-[facility] [id number], Routine collect, 04/09/18 11:13:00 CDT, Blood, Order for future visit, Stop date 04/09/18 11:13:00 CDT, Lab Collect, Hypogonadism, 04/09/18 11:13:00 CDT  Testosterone Level Total, Routine collect, 04/09/18 11:18:00 CDT, Blood, Stop date 04/09/18 11:18:00 CDT, Lab Collect, Hypogonadism, 04/09/18 11:18:00 CDT  ?  Orders:  amphetamine-dextroamphetamine, 20 mg = 1 tab(s), Oral, TID, # 90 tab(s), 0 Refill(s)  amphetamine-dextroamphetamine, 20 mg = 1 tab(s), Oral, TID, # 90 tab(s), 0 Refill(s)  amphetamine-dextroamphetamine, 20 mg = 1 tab(s), Oral, TID, # 90 tab(s), 0 Refill(s)  anastrozole, See Instructions, 1 tab(s) Oral TWICE WEEKLY, # 30 tab(s), 1 Refill(s), Pharmacy: [place], [place]  testosterone, 200 mg = 1 mL, IM, q7day, # 10 mL, 1 Refill(s), Pharmacy: [place], [place]   Problem List/Past Medical History  Ongoing  ADD - Attention deficit disorder  Hypogonadism  Historical  No qualifying data  Medications  Adderall 20 mg oral tablet, 20 mg= 1 tab(s), Oral, TID  Adderall 20 mg oral tablet, 20 mg= 1 tab(s), Oral, TID  Adderall 20 mg oral tablet, 20 mg= 1 tab(s), Oral, TID  Arimidex 1 mg oral tablet, See Instructions, 1 refills  Depo-Testosterone 200 mg/mL intramuscular solution, 200 mg= 1 mL, IM, q7day, 1 refills  Allergies  No Known Allergies  Social History  Alcohol  Current, 1-2 times per year, 04/06/2018  Employment/School  Employed, Work/School description: [employer]., 04/09/2018  Exercise  Exercise duration: 30. Exercise frequency: 3-4 times/week. Exercise type: Running, Weight lifting., 04/09/2018  Home/Environment  Lives with Children,  Spouse. Living situation: Home/Independent., 04/09/2018  Nutrition/Health  PALEO, Caffeine intake amount: 3-4 CUPS OF COFFEE PER DAY., 04/09/2018  Substance Abuse  Never, 04/09/2018  Tobacco  Never smoker, 04/06/2018  Family History  Back problem: Brother.

## 2022-05-02 NOTE — HISTORICAL [facility]
This is a historical note converted from Karthik. Formatting and pictures may have been removed.  Please reference [facility] for original formatting and attached multimedia. Chief Complaint  3mo FV, ADD med refill  History of Present Illness  Pt presents for refill on ADD meds.?Hehasbeen compliant with meds.? Pt states meds are working well.?  -?insomnia related to meds.? Appetite?nlwith meds.  -dry mouth,-?headaches,-?jitteriness with meds.  ? reviewed.?  Narcotic Agreement on chart updated on 06/28/2018.  ?  Review of Systems  See HPI.  Physical Exam  Vitals & Measurements  HR:?72(Peripheral)? BP:?120/70?  HT:?167?cm? HT:?167?cm? WT:?74.2?kg? WT:?74.2?kg?  See Vitals.  Gen:?wd, wn wm in no apparent distress  CV:? reg s mrg  Chest:? cta bilaterally  Assessment/Plan  1.?ADD - Attention deficit disorder  Pt is doing well on medications. Rxs x 3 given.  Ordered:  Clinic Follow up, *Est. 01/04/19 3:00:00 CST, Order for future visit, ADD - Attention deficit disorder  Hypogonadism, [facility]  Office/Outpatient Visit Level 3 Established [id number] PC, ADD - Attention deficit disorder  Hypogonadism, [facility], 10/04/18 11:04:00 CDT  ?  2.?Hypogonadism  ?Depo-test refilled. Check labs.  Ordered:  CBC w/ Auto Diff, Routine collect, 10/04/18 11:04:00 CDT, Blood, Order for future visit, Stop date 10/04/18 11:04:00 CDT, Lab Collect, Hypogonadism, 10/04/18 11:04:00 CDT  Clinic Follow up, *Est. 01/04/19 3:00:00 CST, Order for future visit, ADD - Attention deficit disorder  Hypogonadism, [facility]  Lab Collection Request, 10/04/18 11:04:00 CDT, [facility], 10/04/18 11:04:00 CDT  Office/Outpatient Visit Level 3 Established [id number] PC, ADD - Attention deficit disorder  Hypogonadism, [facility], 10/04/18 11:04:00 CDT  Prostate Specific Antigen, Routine collect, 10/04/18 11:04:00 CDT, Blood, Order for future visit, Stop date 10/04/18 11:04:00 CDT, Lab Collect, Hypogonadism, 10/04/18 11:04:00 CDT  ?  3.?Influenza vaccine  refused  ?  Orders:  amphetamine-dextroamphetamine, 20 mg = 1 tab(s), Oral, TID, # 90 tab(s), 0 Refill(s)  amphetamine-dextroamphetamine, 20 mg = 1 tab(s), Oral, TID, # 90 tab(s), 0 Refill(s)  amphetamine-dextroamphetamine, 20 mg = 1 tab(s), Oral, TID, # 90 tab(s), 0 Refill(s)  testosterone, 200 mg = 1 mL, IM, q7day, # 10 mL, 1 Refill(s), Pharmacy: [place]   Problem List/Past Medical History  Ongoing  ADD - Attention deficit disorder  Hypogonadism  Influenza vaccine refused  Historical  No qualifying data  Medications  Adderall 20 mg oral tablet, 20 mg= 1 tab(s), Oral, TID  Adderall 20 mg oral tablet, 20 mg= 1 tab(s), Oral, TID  Adderall 20 mg oral tablet, 20 mg= 1 tab(s), Oral, TID  Arimidex 1 mg oral tablet, See Instructions, 1 refills  Depo-Testosterone 200 mg/mL intramuscular solution, 200 mg= 1 mL, IM, q7day, 1 refills  Allergies  No Known Allergies  Social History  Alcohol  Current, 1-2 times per year, 04/06/2018  Employment/School  Employed, Work/School description: [employer]., 04/09/2018  Exercise  Exercise duration: 30. Exercise frequency: 3-4 times/week. Exercise type: Running, Weight lifting., 04/09/2018  Home/Environment  Lives with Children, Spouse. Living situation: Home/Independent., 04/09/2018  Nutrition/Health  PALEO, Caffeine intake amount: 3-4 CUPS OF COFFEE PER DAY., 04/09/2018  Substance Abuse  Never, 04/09/2018  Tobacco  Never smoker, 04/06/2018  Family History  Back problem: Brother.  Health Maintenance  Health Maintenance  ???Pending?(in the next year)  ??? ??Due?  ??? ? ? ?ADL Screening due??10/04/18??and every 1??year(s)  ??? ? ? ?Alcohol Misuse Screening due??10/04/18??and every 1??year(s)  ??? ? ? ?Aspirin Therapy for CVD Prevention due??10/04/18??and every 1??year(s)  ??? ? ? ?Depression Screening due??10/04/18??and every?  ??? ? ? ?Diabetes Screening due??10/04/18??and every?  ??? ? ? ?Influenza Vaccine due??10/04/18??and every?  ??? ? ?  ?Tetanus Vaccine due??10/04/18??and every 10??year(s)  ??? ??Due In Future?  ??? ? ? ?Obesity Screening not due until??06/28/19??and every 1??year(s)  ???Satisfied?(in the past 1 year)  ??? ??Satisfied?  ??? ? ? ?Blood Pressure Screening on??10/04/18.??Satisfied by Sravanthi Bonilla  ??? ? ? ?Body Mass Index Check on??06/28/18.??Satisfied by Margy Alvarado  ??? ? ? ?Diabetes Screening on??04/09/18.??Satisfied by Radha Odonnell  ??? ? ? ?Influenza Vaccine on??10/04/18.??Satisfied by Sravanthi Bonilla  ??? ? ? ?Lipid Screening on??04/09/18.??Satisfied by Radah Odonnell  ??? ? ? ?Obesity Screening on??06/28/18.??Satisfied by Margy Alvarado  ?  ?

## 2022-05-19 PROBLEM — F98.8 ADD (ATTENTION DEFICIT DISORDER): Status: ACTIVE | Noted: 2022-05-19

## 2022-05-19 PROBLEM — E29.1 HYPOGONADISM IN MALE: Status: ACTIVE | Noted: 2022-05-19

## 2022-08-21 PROBLEM — Z00.00 ENCOUNTER FOR WELLNESS EXAMINATION IN ADULT: Status: ACTIVE | Noted: 2022-08-21

## 2022-08-23 PROCEDURE — 82670 ASSAY OF TOTAL ESTRADIOL: CPT | Performed by: FAMILY MEDICINE

## 2022-11-09 PROBLEM — Z28.21 IMMUNIZATION REFUSED: Status: ACTIVE | Noted: 2022-11-09

## 2022-11-21 PROBLEM — Z00.00 ENCOUNTER FOR WELLNESS EXAMINATION IN ADULT: Status: RESOLVED | Noted: 2022-08-21 | Resolved: 2022-11-21

## 2023-02-08 PROCEDURE — 82670 ASSAY OF TOTAL ESTRADIOL: CPT | Performed by: FAMILY MEDICINE

## 2023-08-13 PROBLEM — F98.8 ATTENTION DEFICIT DISORDER (ADD) WITHOUT HYPERACTIVITY: Status: ACTIVE | Noted: 2023-08-13

## 2023-08-23 PROBLEM — N52.9 ERECTILE DYSFUNCTION: Status: ACTIVE | Noted: 2023-08-23

## 2023-08-23 PROBLEM — Z12.11 COLON CANCER SCREENING: Status: ACTIVE | Noted: 2023-08-23

## 2023-08-23 PROCEDURE — 82670 ASSAY OF TOTAL ESTRADIOL: CPT | Performed by: FAMILY MEDICINE

## 2023-11-17 PROBLEM — E29.1 HYPOGONADISM IN MALE: Status: RESOLVED | Noted: 2022-05-19 | Resolved: 2023-11-17

## 2023-11-27 PROBLEM — Z00.00 ENCOUNTER FOR WELLNESS EXAMINATION IN ADULT: Status: RESOLVED | Noted: 2022-08-21 | Resolved: 2023-11-27

## 2024-02-20 ENCOUNTER — OFFICE VISIT (OUTPATIENT)
Dept: PRIMARY CARE CLINIC | Facility: CLINIC | Age: 54
End: 2024-02-20
Payer: COMMERCIAL

## 2024-02-20 VITALS
WEIGHT: 175.31 LBS | HEIGHT: 66 IN | BODY MASS INDEX: 28.17 KG/M2 | DIASTOLIC BLOOD PRESSURE: 81 MMHG | HEART RATE: 79 BPM | SYSTOLIC BLOOD PRESSURE: 130 MMHG | OXYGEN SATURATION: 96 % | TEMPERATURE: 99 F

## 2024-02-20 DIAGNOSIS — F98.8 ATTENTION DEFICIT DISORDER (ADD) WITHOUT HYPERACTIVITY: Primary | ICD-10-CM

## 2024-02-20 PROCEDURE — 1159F MED LIST DOCD IN RCRD: CPT | Mod: CPTII,,, | Performed by: FAMILY MEDICINE

## 2024-02-20 PROCEDURE — 3075F SYST BP GE 130 - 139MM HG: CPT | Mod: CPTII,,, | Performed by: FAMILY MEDICINE

## 2024-02-20 PROCEDURE — 1160F RVW MEDS BY RX/DR IN RCRD: CPT | Mod: CPTII,,, | Performed by: FAMILY MEDICINE

## 2024-02-20 PROCEDURE — 99213 OFFICE O/P EST LOW 20 MIN: CPT | Mod: ,,, | Performed by: FAMILY MEDICINE

## 2024-02-20 PROCEDURE — 3008F BODY MASS INDEX DOCD: CPT | Mod: CPTII,,, | Performed by: FAMILY MEDICINE

## 2024-02-20 PROCEDURE — 3079F DIAST BP 80-89 MM HG: CPT | Mod: CPTII,,, | Performed by: FAMILY MEDICINE

## 2024-02-20 RX ORDER — DEXTROAMPHETAMINE SACCHARATE, AMPHETAMINE ASPARTATE, DEXTROAMPHETAMINE SULFATE AND AMPHETAMINE SULFATE 5; 5; 5; 5 MG/1; MG/1; MG/1; MG/1
1 TABLET ORAL 3 TIMES DAILY
Qty: 90 TABLET | Refills: 0 | Status: SHIPPED | OUTPATIENT
Start: 2024-02-20 | End: 2024-03-21

## 2024-02-20 RX ORDER — DEXTROAMPHETAMINE SACCHARATE, AMPHETAMINE ASPARTATE, DEXTROAMPHETAMINE SULFATE AND AMPHETAMINE SULFATE 5; 5; 5; 5 MG/1; MG/1; MG/1; MG/1
TABLET ORAL
Qty: 90 TABLET | Refills: 0 | Status: SHIPPED | OUTPATIENT
Start: 2024-02-20 | End: 2024-05-21 | Stop reason: SDUPTHER

## 2024-02-29 ENCOUNTER — PATIENT OUTREACH (OUTPATIENT)
Dept: ADMINISTRATIVE | Facility: HOSPITAL | Age: 54
End: 2024-02-29

## 2024-02-29 NOTE — PROGRESS NOTES
Population Health. Out Reach. Reviewing patient's chart for quality metrics.I attempted to contact patient to see if he has had a recent colorectal cancer screening. No answer.    2/29/24 pt return call re:colorectal screen, [place] and he will call back once he can get this lalo

## 2024-05-19 NOTE — PROGRESS NOTES
"Medication Refill       HPI:  Patient presents for 3 month recheck/refill medications.  Patient states medications are working well; he is able to concentrate, focus and stay on task.  He denies anxiety, insomnia, palpitations, unintentional weight loss, headaches or dry mouth.   reviewed.    Narcotics agreement updated 02/20/2024.        Current Outpatient Medications   Medication Instructions    anastrozole (ARIMIDEX) 1 mg Tab TAKE 1 TABLET BY MOUTH ON MONDAY, WEDNESDAY AND FRIDAY    dextroamphetamine-amphetamine (ADDERALL) 20 mg tablet Take 1 tablet by mouth TID.    dextroamphetamine-amphetamine (ADDERALL) 20 mg tablet Take 1 tablet by mouth three times a day. Fill: 6/20/2024.    dextroamphetamine-amphetamine (ADDERALL) 20 mg tablet Take 1 tablet by mouth TID. Fill: 7/19/2024.    tadalafiL (CIALIS) 5 mg, Oral, Daily PRN    testosterone cypionate (DEPOTESTOTERONE CYPIONATE) 200 mg, Intramuscular, Every 7 days         ROS:    He is sleeping well.  His appetite is good.  He exercises 3-4 days a week.    PE:    ..Visit Vitals  /77   Pulse 66   Temp 97.9 °F (36.6 °C)   Ht 5' 6" (1.676 m)   Wt 78.2 kg (172 lb 6.4 oz)   SpO2 98%   BMI 27.83 kg/m²          General:  He is well-developed well-nourished white male in no apparent distress.  He is alert and oriented.    Chest: Clear to auscultation bilaterally.    CV:  Regular rate and rhythm without murmurs rubs or gallops.      1. Attention deficit disorder (ADD) without hyperactivity  Overview:  Patient is doing well on medications; refills x3 given.    05/21/2024: Patient is doing well on medications; refills x3 given.      Other orders  -     dextroamphetamine-amphetamine (ADDERALL) 20 mg tablet; Take 1 tablet by mouth TID.  Dispense: 90 tablet; Refill: 0  -     dextroamphetamine-amphetamine (ADDERALL) 20 mg tablet; Take 1 tablet by mouth three times a day. Fill: 6/20/2024.  Dispense: 90 tablet; Refill: 0  -     dextroamphetamine-amphetamine (ADDERALL) 20 mg " tablet; Take 1 tablet by mouth TID. Fill: 7/19/2024.  Dispense: 90 tablet; Refill: 0              ..Follow up in about 3 months (around 8/21/2024) for Wellness.       Future Appointments   Date Time Provider Department Center   8/23/2024  7:30 AM Remy Shen MD [facility]

## 2024-05-21 ENCOUNTER — OFFICE VISIT (OUTPATIENT)
Dept: PRIMARY CARE CLINIC | Facility: CLINIC | Age: 54
End: 2024-05-21
Payer: COMMERCIAL

## 2024-05-21 VITALS
HEIGHT: 66 IN | WEIGHT: 172.38 LBS | HEART RATE: 66 BPM | SYSTOLIC BLOOD PRESSURE: 118 MMHG | TEMPERATURE: 98 F | OXYGEN SATURATION: 98 % | DIASTOLIC BLOOD PRESSURE: 77 MMHG | BODY MASS INDEX: 27.7 KG/M2

## 2024-05-21 DIAGNOSIS — F98.8 ATTENTION DEFICIT DISORDER (ADD) WITHOUT HYPERACTIVITY: Primary | ICD-10-CM

## 2024-05-21 DIAGNOSIS — Z12.5 SCREENING PSA (PROSTATE SPECIFIC ANTIGEN): ICD-10-CM

## 2024-05-21 DIAGNOSIS — Z00.00 WELLNESS EXAMINATION: Primary | ICD-10-CM

## 2024-05-21 DIAGNOSIS — E29.1 HYPOGONADISM IN MALE: ICD-10-CM

## 2024-05-21 PROCEDURE — 99213 OFFICE O/P EST LOW 20 MIN: CPT | Mod: ,,, | Performed by: FAMILY MEDICINE

## 2024-05-21 PROCEDURE — 3074F SYST BP LT 130 MM HG: CPT | Mod: CPTII,,, | Performed by: FAMILY MEDICINE

## 2024-05-21 PROCEDURE — 3008F BODY MASS INDEX DOCD: CPT | Mod: CPTII,,, | Performed by: FAMILY MEDICINE

## 2024-05-21 PROCEDURE — 3078F DIAST BP <80 MM HG: CPT | Mod: CPTII,,, | Performed by: FAMILY MEDICINE

## 2024-05-21 PROCEDURE — 1159F MED LIST DOCD IN RCRD: CPT | Mod: CPTII,,, | Performed by: FAMILY MEDICINE

## 2024-05-21 RX ORDER — DEXTROAMPHETAMINE SACCHARATE, AMPHETAMINE ASPARTATE, DEXTROAMPHETAMINE SULFATE AND AMPHETAMINE SULFATE 5; 5; 5; 5 MG/1; MG/1; MG/1; MG/1
TABLET ORAL
Qty: 90 TABLET | Refills: 0 | Status: SHIPPED | OUTPATIENT
Start: 2024-05-21

## 2024-08-13 DIAGNOSIS — Z00.00 WELLNESS EXAMINATION: Primary | ICD-10-CM

## 2024-08-15 ENCOUNTER — LAB VISIT (OUTPATIENT)
Dept: LAB | Facility: HOSPITAL | Age: 54
End: 2024-08-15
Attending: FAMILY MEDICINE
Payer: COMMERCIAL

## 2024-08-15 DIAGNOSIS — Z00.00 WELLNESS EXAMINATION: ICD-10-CM

## 2024-08-15 DIAGNOSIS — E29.1 HYPOGONADISM IN MALE: ICD-10-CM

## 2024-08-15 DIAGNOSIS — Z12.5 SCREENING PSA (PROSTATE SPECIFIC ANTIGEN): ICD-10-CM

## 2024-08-15 LAB
25(OH)D3+25(OH)D2 SERPL-MCNC: 43 NG/ML (ref 30–80)
ALBUMIN SERPL-MCNC: 4.1 G/DL (ref 3.5–5)
ALBUMIN/GLOB SERPL: 1.6 RATIO (ref 1.1–2)
ALP SERPL-CCNC: 67 UNIT/L (ref 40–150)
ALT SERPL-CCNC: 20 UNIT/L (ref 0–55)
ANION GAP SERPL CALC-SCNC: 7 MEQ/L
AST SERPL-CCNC: 22 UNIT/L (ref 5–34)
BACTERIA #/AREA URNS AUTO: ABNORMAL /HPF
BASOPHILS # BLD AUTO: 0.02 X10(3)/MCL
BASOPHILS NFR BLD AUTO: 0.5 %
BILIRUB SERPL-MCNC: 0.4 MG/DL
BILIRUB UR QL STRIP.AUTO: NEGATIVE
BUN SERPL-MCNC: 15.8 MG/DL (ref 8.4–25.7)
CALCIUM SERPL-MCNC: 9.7 MG/DL (ref 8.4–10.2)
CHLORIDE SERPL-SCNC: 107 MMOL/L (ref 98–107)
CLARITY UR: CLEAR
CO2 SERPL-SCNC: 29 MMOL/L (ref 22–29)
COLOR UR AUTO: ABNORMAL
CREAT SERPL-MCNC: 1.13 MG/DL (ref 0.73–1.18)
CREAT/UREA NIT SERPL: 14
EOSINOPHIL # BLD AUTO: 0.11 X10(3)/MCL (ref 0–0.9)
EOSINOPHIL NFR BLD AUTO: 2.7 %
ERYTHROCYTE [DISTWIDTH] IN BLOOD BY AUTOMATED COUNT: 13.6 % (ref 11.5–17)
ESTRADIOL SERPL HS-MCNC: <24 PG/ML
GFR SERPLBLD CREATININE-BSD FMLA CKD-EPI: >60 ML/MIN/1.73/M2
GLOBULIN SER-MCNC: 2.6 GM/DL (ref 2.4–3.5)
GLUCOSE SERPL-MCNC: 99 MG/DL (ref 74–100)
GLUCOSE UR QL STRIP: NORMAL
HCT VFR BLD AUTO: 46 % (ref 42–52)
HGB BLD-MCNC: 15.3 G/DL (ref 14–18)
HGB UR QL STRIP: NEGATIVE
IMM GRANULOCYTES # BLD AUTO: 0.01 X10(3)/MCL (ref 0–0.04)
IMM GRANULOCYTES NFR BLD AUTO: 0.2 %
KETONES UR QL STRIP: NEGATIVE
LEUKOCYTE ESTERASE UR QL STRIP: NEGATIVE
LYMPHOCYTES # BLD AUTO: 1.08 X10(3)/MCL (ref 0.6–4.6)
LYMPHOCYTES NFR BLD AUTO: 26.3 %
MCH RBC QN AUTO: 30.2 PG (ref 27–31)
MCHC RBC AUTO-ENTMCNC: 33.3 G/DL (ref 33–36)
MCV RBC AUTO: 90.9 FL (ref 80–94)
MONOCYTES # BLD AUTO: 0.56 X10(3)/MCL (ref 0.1–1.3)
MONOCYTES NFR BLD AUTO: 13.6 %
MUCOUS THREADS URNS QL MICRO: ABNORMAL /LPF
NEUTROPHILS # BLD AUTO: 2.33 X10(3)/MCL (ref 2.1–9.2)
NEUTROPHILS NFR BLD AUTO: 56.7 %
NITRITE UR QL STRIP: NEGATIVE
NRBC BLD AUTO-RTO: 0 %
PH UR STRIP: 6 [PH]
PLATELET # BLD AUTO: 191 X10(3)/MCL (ref 130–400)
PLATELETS.RETICULATED NFR BLD AUTO: 5.2 % (ref 0.9–11.2)
PMV BLD AUTO: 9.9 FL (ref 7.4–10.4)
POTASSIUM SERPL-SCNC: 4.8 MMOL/L (ref 3.5–5.1)
PROT SERPL-MCNC: 6.7 GM/DL (ref 6.4–8.3)
PROT UR QL STRIP: NEGATIVE
PSA SERPL-MCNC: 2.74 NG/ML
RBC # BLD AUTO: 5.06 X10(6)/MCL (ref 4.7–6.1)
RBC #/AREA URNS AUTO: ABNORMAL /HPF
SODIUM SERPL-SCNC: 143 MMOL/L (ref 136–145)
SP GR UR STRIP.AUTO: 1.01 (ref 1–1.03)
SQUAMOUS #/AREA URNS LPF: ABNORMAL /HPF
TESTOST SERPL-MCNC: 974.87 NG/DL (ref 220.91–715.81)
UROBILINOGEN UR STRIP-ACNC: NORMAL
WBC # BLD AUTO: 4.11 X10(3)/MCL (ref 4.5–11.5)
WBC #/AREA URNS AUTO: ABNORMAL /HPF

## 2024-08-15 PROCEDURE — 82670 ASSAY OF TOTAL ESTRADIOL: CPT

## 2024-08-15 PROCEDURE — 82306 VITAMIN D 25 HYDROXY: CPT

## 2024-08-15 PROCEDURE — 84153 ASSAY OF PSA TOTAL: CPT

## 2024-08-15 PROCEDURE — 84403 ASSAY OF TOTAL TESTOSTERONE: CPT

## 2024-08-15 PROCEDURE — 80053 COMPREHEN METABOLIC PANEL: CPT

## 2024-08-15 PROCEDURE — 85025 COMPLETE CBC W/AUTO DIFF WBC: CPT

## 2024-08-15 PROCEDURE — 36415 COLL VENOUS BLD VENIPUNCTURE: CPT

## 2024-08-15 PROCEDURE — 81001 URINALYSIS AUTO W/SCOPE: CPT

## 2024-08-20 NOTE — PROGRESS NOTES
..No chief complaint on file.       HPI:     Patient presents for wellness examination.    He has been feeling well.    He sleeps well.    His appetite is good.    He is  with 2 children; 2 are in college.  He is now an  at [email or web address].  He has alcohol on occasion.    He does not smoke.    He has 3 cups of coffee a day.  No soft drinks.  He exercises 4 times a week.  He has never had a colonoscopy; he would like to do a Cologuard.    The patient's Health Maintenance was reviewed and the following appears to be due at this time:   Health Maintenance Due   Topic Date Due    Hepatitis C Screening  Never done    HIV Screening  Never done    TETANUS VACCINE  Never done    Colorectal Cancer Screening  Never done    Shingles Vaccine (1 of 2) Never done    COVID-19 Vaccine (2 - 2023-24 season) 09/01/2023    Hemoglobin A1c (Prediabetes)  08/23/2024       ..  Past Medical History:   Diagnosis Date    Hypogonadism in male           ..  Past Surgical History:   Procedure Laterality Date    EYE SURGERY  2016    PRK surgery    ROTATOR CUFF REPAIR Right 2014    TONSILLECTOMY  1978    tonsils removed when I was 7 or 8          Current Outpatient Medications   Medication Instructions    anastrozole (ARIMIDEX) 1 mg Tab TAKE 1 TABLET BY MOUTH ON MONDAY, WEDNESDAY AND FRIDAY    dextroamphetamine-amphetamine (ADDERALL) 20 mg tablet Take 1 tablet by mouth TID.    dextroamphetamine-amphetamine (ADDERALL) 20 mg tablet Take 1 tablet by mouth three times a day. Fill: 6/20/2024.    dextroamphetamine-amphetamine (ADDERALL) 20 mg tablet Take 1 tablet by mouth TID. Fill: 7/19/2024.    tadalafiL (CIALIS) 5 mg, Oral, Daily PRN    testosterone cypionate (DEPOTESTOTERONE CYPIONATE) 200 mg, Intramuscular, Every 7 days         ..  Social History     Socioeconomic History    Marital status:      Spouse name: Lianne    Number of children: 2   Occupational History    Occupation: manager at [employer]   Tobacco Use    Smoking  status: Never    Smokeless tobacco: Never   Substance and Sexual Activity    Alcohol use: Yes     Alcohol/week: 1.0 standard drink of alcohol     Types: 1 Glasses of wine per week     Comment: 1-2 times per week    Drug use: Never    Sexual activity: Yes     Partners: Female     Birth control/protection: None     Social Determinants of Health     Financial Resource Strain: Low Risk  (2/5/2024)    Overall Financial Resource Strain (CARDIA)     Difficulty of Paying Living Expenses: Not hard at all   Food Insecurity: No Food Insecurity (2/5/2024)    Hunger Vital Sign     Worried About Running Out of Food in the Last Year: Never true     Ran Out of Food in the Last Year: Never true   Transportation Needs: No Transportation Needs (2/5/2024)    PRAPARE - Transportation     Lack of Transportation (Medical): No     Lack of Transportation (Non-Medical): No   Physical Activity: Sufficiently Active (2/5/2024)    Exercise Vital Sign     Days of Exercise per Week: 4 days     Minutes of Exercise per Session: 60 min   Stress: No Stress Concern Present (2/5/2024)    [place] [employer] [employer] - Occupational Stress Questionnaire     Feeling of Stress : Only a little   Housing Stability: Low Risk  (2/5/2024)    Housing Stability Vital Sign     Unable to Pay for Housing in the Last Year: No     Number of Places Lived in the Last Year: 1     Unstable Housing in the Last Year: No          ..  Family History   Problem Relation Name Age of Onset    No Known Problems Mother      No Known Problems Father      No Known Problems Sister      Multiple sclerosis Brother            ..Review of patient's allergies indicates:  No Known Allergies       ..  Immunization History   Administered Date(s) Administered    COVID-19, vector-nr, rS-Ad26, PF (eXenSa) 08/11/2021          REVIEW OF SYSTEMS:    GENERAL: No weight loss, no weight gain, no fever, no fatigue, no chills, no night sweats  HEENT: No sore throat, no ear pain, no sinus  pressure, no nasal congestion, no rhinorrhea, no decreased hearing, no snoring  VISION: No vision changes, no blurry vision, no double vision, no glaucoma, no cataracts, + reading glasses  LAST EYE Exam: history of PRK surgery  NECK: no LAD  CARDIAC: No chest pain, no palpitations, no dyspnea on exertion, no orthopnea  RESPIRATORY: No cough, no wheezing, no sputum production, no SOB  GI: No abdominal pain, no N/V, no heartburn, no constipation, no diarrhea, no blood in stool, (- ) family history of Colon Ca  : No dysuria, no hematuria, no frequency, occasionally incomplete emptying,  no urgency, no incontinence, no testicular pain/swelling, (- ) family history of Prostate Ca  MUSC/SKEL: No myalgia, no weakness, no edema, no arthralgia, no joint swelling/effusions  SKIN: No rashes, no hives, no itching, no sores  NEURO: No HA, no numbness, no tingling, no weakness, no dizziness  PSYCH: No anxiety, no depression, no irritability, no panic attacks, no s/i, no h/i, no hallucinations  ENDO: No polyuria, no polyphagia, no polydipsia  HEME: No bruising, no bleeding disorders, no signs of anemia.       PHYSICAL EXAM:    ..There were no vitals taken for this visit.     General: Well developed, well nourished white male in no apparent distress, alert and oriented x3  Skin: No rash or abnormal lesions  HEENT: Normocephalic, PERRLA, EOMI, mouth WNL, throat WNL, nares normal, EAC and TM WNL bilaterally  Neck: FROM, no LAD, no thyroid abnormalities palpable  Chest: CTA bilaterally, no wheezes crackles or rubs  Cardiac: RRR, no murmurs, rubs, gallops  Abdomen: Soft, nontender, nondistended, NBSx4, no rebound tenderness or guarding, no HSM  Extremities: No clubbing, cyanosis, or edema. Joints WNL, +2 DP/PT pulses bilaterally  Neuro: No sensory or motor defects noted. CN II-XII intact. Gait WNL.  Genital: normal testes, no hernias  Rectal:  Deferred       1. Encounter for wellness examination in adult  Overview:  Patient presents  for wellness examination.    He has been feeling well.    He continues to exercise regularly.    Patient had his last tetanus approximately 3 years ago.  Patient had to cancel his colonoscopy last year; he will call to reschedule.  Labs pending.      2. Attention deficit disorder (ADD) without hyperactivity  Overview:  Patient is doing well on medications; refills x3 given.    05/21/2024: Patient is doing well on medications; refills x3 given.      3. Hypogonadism in male  Overview:  CBC, estrogen, PSA done 2/2023---stable.  On testosterone cyp 1ml once weekly.  Patient denies any side effects.    08/23/2023:  Patient continues to do well injections.  He denies any side effects secondary to therapy.    Refills for testosterone sent.    CBC, PSA and estradiol levels pending.      4. Erectile dysfunction, unspecified erectile dysfunction type  Overview:  Doing well; continue daily tadalafil.  Refills sent.      5. Colon cancer screening  Overview:  Patient had to schedule planned colonoscopy last year.    He will call to reschedule.           ..No follow-ups on file.     Future Appointments   Date Time Provider Department Center   8/23/2024  7:30 AM Remy Shen MD [facility]

## 2024-08-21 ENCOUNTER — PATIENT OUTREACH (OUTPATIENT)
Facility: CLINIC | Age: 54
End: 2024-08-21
Payer: COMMERCIAL

## 2024-08-23 ENCOUNTER — OFFICE VISIT (OUTPATIENT)
Dept: PRIMARY CARE CLINIC | Facility: CLINIC | Age: 54
End: 2024-08-23
Payer: COMMERCIAL

## 2024-08-23 VITALS
HEART RATE: 77 BPM | OXYGEN SATURATION: 98 % | DIASTOLIC BLOOD PRESSURE: 73 MMHG | SYSTOLIC BLOOD PRESSURE: 121 MMHG | BODY MASS INDEX: 27.82 KG/M2 | WEIGHT: 173.13 LBS | TEMPERATURE: 99 F | HEIGHT: 66 IN

## 2024-08-23 DIAGNOSIS — E29.1 HYPOGONADISM IN MALE: ICD-10-CM

## 2024-08-23 DIAGNOSIS — Z00.00 ENCOUNTER FOR WELLNESS EXAMINATION IN ADULT: Primary | ICD-10-CM

## 2024-08-23 DIAGNOSIS — Z28.21 IMMUNIZATION REFUSED: ICD-10-CM

## 2024-08-23 DIAGNOSIS — F98.8 ATTENTION DEFICIT DISORDER (ADD) WITHOUT HYPERACTIVITY: ICD-10-CM

## 2024-08-23 DIAGNOSIS — Z12.11 COLON CANCER SCREENING: ICD-10-CM

## 2024-08-23 DIAGNOSIS — Z12.5 SCREENING PSA (PROSTATE SPECIFIC ANTIGEN): ICD-10-CM

## 2024-08-23 DIAGNOSIS — N52.9 ERECTILE DYSFUNCTION, UNSPECIFIED ERECTILE DYSFUNCTION TYPE: ICD-10-CM

## 2024-08-23 RX ORDER — DEXTROAMPHETAMINE SACCHARATE, AMPHETAMINE ASPARTATE, DEXTROAMPHETAMINE SULFATE AND AMPHETAMINE SULFATE 5; 5; 5; 5 MG/1; MG/1; MG/1; MG/1
TABLET ORAL
Qty: 90 TABLET | Refills: 0 | Status: SHIPPED | OUTPATIENT
Start: 2024-08-23

## 2024-11-11 NOTE — PROGRESS NOTES
"Medication Refill (Declines flu vaccine)       HPI:    Patient presents for 3 month recheck/refill medications.  Patient states medications are working well; he is able to concentrate, focus and stay on task.  He denies anxiety, insomnia, palpitations, unintentional weight loss, headaches or dry mouth.   reviewed.    Narcotics agreement updated 02/20/2024.      Current Outpatient Medications   Medication Instructions    anastrozole (ARIMIDEX) 1 mg Tab TAKE 1 TABLET BY MOUTH ON MONDAY, WEDNESDAY AND FRIDAY    dextroamphetamine-amphetamine (ADDERALL) 20 mg tablet Take 1 tablet by mouth TID.    dextroamphetamine-amphetamine (ADDERALL) 20 mg tablet Take 1 tablet by mouth three times a day. Fill: 12/13/2024.    dextroamphetamine-amphetamine (ADDERALL) 20 mg tablet Take 1 tablet by mouth TID. Fill: 1/13/2025.    tadalafiL (CIALIS) 5 mg, Oral, Daily PRN    testosterone cypionate (DEPOTESTOTERONE CYPIONATE) 200 mg, Intramuscular, Every 7 days         ROS:    He has been feeling well.  He is sleeping well.  His appetite is good.  He exercises 3-4 days a week.      PE:    ..Visit Vitals  /76   Pulse 74   Temp 97.6 °F (36.4 °C)   Ht 5' 6" (1.676 m)   Wt 77.9 kg (171 lb 11.2 oz)   SpO2 96%   BMI 27.71 kg/m²          General:  He is a well-developed well-nourished white male in no apparent distress.  He is alert and oriented.    Chest: Clear to auscultation bilaterally.    CV:  Regular rate and rhythm without murmurs rubs or gallops.      1. Attention deficit disorder (ADD) without hyperactivity  Overview:  Patient is doing well on medications; refills x3 given.    05/21/2024: Patient is doing well on medications; refills x3 given.    08/23/2024: Patient is doing well on medications; refills x3 given.    Assessment & Plan:  Patient is doing well on medications; refills x3 given.    Orders:  -     dextroamphetamine-amphetamine (ADDERALL) 20 mg tablet; Take 1 tablet by mouth TID.  Dispense: 90 tablet; Refill: 0  -     " dextroamphetamine-amphetamine (ADDERALL) 20 mg tablet; Take 1 tablet by mouth three times a day. Fill: 12/13/2024.  Dispense: 90 tablet; Refill: 0  -     dextroamphetamine-amphetamine (ADDERALL) 20 mg tablet; Take 1 tablet by mouth TID. Fill: 1/13/2025.  Dispense: 90 tablet; Refill: 0    2. Immunization declined  Overview:  Patient declines a Shingrix vaccine; benefits/potential risks/side effects discussed with patient.    08/23/2024: Patient continues to decline a Shingrix vaccine; benefits/potential risks/side effects discussed with patient    Assessment & Plan:  Patient declines influenza vaccine; benefits, side effects and risks discussed with patient.                ..Follow up in about 3 months (around 2/14/2025) for ADD Follow Up.       Future Appointments   Date Time Provider Department Center   2/13/2025  7:30 AM Remy Shen MD [facility] [place]    8/28/2025  7:30 AM Remy Shen MD [facility] [place]

## 2024-11-14 ENCOUNTER — OFFICE VISIT (OUTPATIENT)
Dept: PRIMARY CARE CLINIC | Facility: CLINIC | Age: 54
End: 2024-11-14
Payer: COMMERCIAL

## 2024-11-14 VITALS
OXYGEN SATURATION: 96 % | SYSTOLIC BLOOD PRESSURE: 121 MMHG | BODY MASS INDEX: 27.59 KG/M2 | HEART RATE: 74 BPM | WEIGHT: 171.69 LBS | HEIGHT: 66 IN | DIASTOLIC BLOOD PRESSURE: 76 MMHG | TEMPERATURE: 98 F

## 2024-11-14 DIAGNOSIS — Z28.21 IMMUNIZATION DECLINED: ICD-10-CM

## 2024-11-14 DIAGNOSIS — F98.8 ATTENTION DEFICIT DISORDER (ADD) WITHOUT HYPERACTIVITY: Primary | ICD-10-CM

## 2024-11-14 RX ORDER — DEXTROAMPHETAMINE SACCHARATE, AMPHETAMINE ASPARTATE, DEXTROAMPHETAMINE SULFATE AND AMPHETAMINE SULFATE 5; 5; 5; 5 MG/1; MG/1; MG/1; MG/1
TABLET ORAL
Qty: 90 TABLET | Refills: 0 | Status: SHIPPED | OUTPATIENT
Start: 2024-11-14

## 2025-02-10 NOTE — PROGRESS NOTES
"Medication Refill (3 month med refill)       HPI:    Patient presents for 3 month recheck/refill medications.  Patient states medications are working well; he is able to concentrate, focus and stay on task.  He denies anxiety, insomnia, palpitations, unintentional weight loss, headaches or dry mouth.   reviewed.    Narcotics agreement updated 02/13/2025.      Current Outpatient Medications   Medication Instructions    anastrozole (ARIMIDEX) 1 mg Tab TAKE 1 TABLET BY MOUTH ON MONDAY, WEDNESDAY AND FRIDAY    dextroamphetamine-amphetamine (ADDERALL) 20 mg tablet Take 1 tablet by mouth TID.    dextroamphetamine-amphetamine (ADDERALL) 20 mg tablet Take 1 tablet by mouth three times a day. Fill: 3/12/2025.    dextroamphetamine-amphetamine (ADDERALL) 20 mg tablet Take 1 tablet by mouth TID. Fill: 4/11/2025.    tadalafiL (CIALIS) 5 mg, Oral, Daily PRN    testosterone cypionate (DEPOTESTOTERONE CYPIONATE) 200 mg, Intramuscular, Every 7 days         ROS:    He has been feeling well.  He is sleeping well.  His appetite is good.  He exercises 3-4 days a week.      PE:    ..Visit Vitals  /75   Pulse 76   Temp 98.3 °F (36.8 °C)   Ht 5' 6" (1.676 m)   Wt 77.2 kg (170 lb 4.8 oz)   SpO2 98%   BMI 27.49 kg/m²        General:  He is a well-developed well-nourished white male in no apparent distress.  He is alert and oriented.    Chest: Clear to auscultation bilaterally.    CV:  Regular rate and rhythm without murmurs rubs or gallops.      1. Attention deficit disorder (ADD) without hyperactivity  Overview:  Patient is doing well on medications; refills x3 given.    05/21/2024: Patient is doing well on medications; refills x3 given.    08/23/2024: Patient is doing well on medications; refills x3 given.    Assessment & Plan:  Patient is doing well on medications; refills x3 given.  Narcotics agreement updated.    Orders:  -     dextroamphetamine-amphetamine (ADDERALL) 20 mg tablet; Take 1 tablet by mouth TID.  Dispense: 90 " tablet; Refill: 0  -     dextroamphetamine-amphetamine (ADDERALL) 20 mg tablet; Take 1 tablet by mouth three times a day. Fill: 3/12/2025.  Dispense: 90 tablet; Refill: 0  -     dextroamphetamine-amphetamine (ADDERALL) 20 mg tablet; Take 1 tablet by mouth TID. Fill: 4/11/2025.  Dispense: 90 tablet; Refill: 0    2. Hypogonadism in male  Overview:  CBC, estrogen, PSA done 2/2023---stable.  On testosterone cyp 1ml once weekly.  Patient denies any side effects.    08/23/2023:  Patient continues to do well injections.  He denies any side effects secondary to therapy.    Refills for testosterone sent.    CBC, PSA and estradiol levels pending.    08/23/2024: Patient continues to do well on injections.  He denies any side effects secondary to injections.  Hemoglobin/hematocrit: 15.3 46.0.  PSA 2.74.  Estradiol less than 24.  Patient takes 0.5 cc every 4 days.      Assessment & Plan:  Refills for testosterone sent to pharmacy.    Orders:  -     testosterone cypionate (DEPOTESTOTERONE CYPIONATE) 200 mg/mL injection; Inject 1 mL (200 mg total) into the muscle every 7 days.  Dispense: 10 mL; Refill: 1              ..Follow up in about 3 months (around 5/13/2025) for Follow Up, ADD Follow Up.       Future Appointments   Date Time Provider Department Center   8/28/2025  7:30 AM [facility]

## 2025-02-13 ENCOUNTER — OFFICE VISIT (OUTPATIENT)
Dept: PRIMARY CARE CLINIC | Facility: CLINIC | Age: 55
End: 2025-02-13
Payer: COMMERCIAL

## 2025-02-13 VITALS
DIASTOLIC BLOOD PRESSURE: 75 MMHG | WEIGHT: 170.31 LBS | TEMPERATURE: 98 F | HEIGHT: 66 IN | OXYGEN SATURATION: 98 % | BODY MASS INDEX: 27.37 KG/M2 | SYSTOLIC BLOOD PRESSURE: 125 MMHG | HEART RATE: 76 BPM

## 2025-02-13 DIAGNOSIS — E29.1 HYPOGONADISM IN MALE: ICD-10-CM

## 2025-02-13 DIAGNOSIS — F98.8 ATTENTION DEFICIT DISORDER (ADD) WITHOUT HYPERACTIVITY: Primary | ICD-10-CM

## 2025-02-13 RX ORDER — DEXTROAMPHETAMINE SACCHARATE, AMPHETAMINE ASPARTATE, DEXTROAMPHETAMINE SULFATE AND AMPHETAMINE SULFATE 5; 5; 5; 5 MG/1; MG/1; MG/1; MG/1
TABLET ORAL
Qty: 90 TABLET | Refills: 0 | Status: SHIPPED | OUTPATIENT
Start: 2025-02-13

## 2025-02-13 RX ORDER — TESTOSTERONE CYPIONATE 200 MG/ML
200 INJECTION, SOLUTION INTRAMUSCULAR
Qty: 10 ML | Refills: 1 | Status: SHIPPED | OUTPATIENT
Start: 2025-02-13

## 2025-05-07 NOTE — PROGRESS NOTES
"Medication Refill (3 month med refill/Spasms to right hand finger trigger finger)       HPI:    Patient presents for 3 month recheck/refill medications.  Patient states medications are working well; he is able to concentrate, focus and stay on task.  He denies anxiety, insomnia, palpitations, unintentional weight loss, headaches or dry mouth.   reviewed.    Narcotics agreement updated 02/13/2025.      Current Outpatient Medications   Medication Instructions    anastrozole (ARIMIDEX) 1 mg Tab TAKE 1 TABLET BY MOUTH ON MONDAY, WEDNESDAY AND FRIDAY    dextroamphetamine-amphetamine (ADDERALL) 20 mg tablet Take 1 tablet by mouth TID.    dextroamphetamine-amphetamine (ADDERALL) 20 mg tablet Take 1 tablet by mouth three times a day. Fill: 6/13/2025.    dextroamphetamine-amphetamine (ADDERALL) 20 mg tablet Take 1 tablet by mouth TID. Fill: 7/14/2025.    tadalafiL (CIALIS) 5 mg, Oral, Daily PRN    testosterone cypionate (DEPOTESTOTERONE CYPIONATE) 200 mg, Intramuscular, Every 7 days         ROS:    He has been feeling well.  He is sleeping well.  His appetite is good.  He exercises 4 days a week.    He reports intermittent     Last Tdap was 5 years ago.       PE:    ..Visit Vitals  /76   Pulse 74   Temp 97.8 °F (36.6 °C)   Ht 5' 6" (1.676 m)   Wt 76.1 kg (167 lb 11.2 oz)   SpO2 98%   BMI 27.07 kg/m²          General:  He is a well-developed well-nourished white male in no apparent distress.  He is alert and oriented.    Chest: Clear to auscultation bilaterally.    CV:  Regular rate and rhythm without murmurs rubs or gallops  Right hand: Ring finger triggers    1. Attention deficit disorder (ADD) without hyperactivity  Overview:  Patient is doing well on medications; refills x3 given.    05/21/2024: Patient is doing well on medications; refills x3 given.    08/23/2024: Patient is doing well on medications; refills x3 given.    Assessment & Plan:  Patient is doing well on medications; refills x3 given.    Orders:  -   "   dextroamphetamine-amphetamine (ADDERALL) 20 mg tablet; Take 1 tablet by mouth TID.  Dispense: 90 tablet; Refill: 0  -     dextroamphetamine-amphetamine (ADDERALL) 20 mg tablet; Take 1 tablet by mouth three times a day. Fill: 6/13/2025.  Dispense: 90 tablet; Refill: 0  -     dextroamphetamine-amphetamine (ADDERALL) 20 mg tablet; Take 1 tablet by mouth TID. Fill: 7/14/2025.  Dispense: 90 tablet; Refill: 0    2. Trigger ring finger of right hand  Overview:  05/15/2025: Patient presents with right ring trigger finger times one-month.  Condition/treatment options discussed with patient.    Patient declines referral to orthopedist at this time.    If symptoms worsen, he will call me for referral.                ..Follow up in about 3 months (around 8/15/2025) for Wellness, With labwork prior to visit.       Future Appointments   Date Time Provider Department Center   8/18/2025  9:30 AM Remy Shen MD [facility] [place]    8/28/2025  7:30 AM Remy Shen MD [facility]

## 2025-05-15 ENCOUNTER — OFFICE VISIT (OUTPATIENT)
Dept: PRIMARY CARE CLINIC | Facility: CLINIC | Age: 55
End: 2025-05-15
Payer: COMMERCIAL

## 2025-05-15 VITALS
SYSTOLIC BLOOD PRESSURE: 121 MMHG | TEMPERATURE: 98 F | WEIGHT: 167.69 LBS | HEIGHT: 66 IN | OXYGEN SATURATION: 98 % | DIASTOLIC BLOOD PRESSURE: 76 MMHG | HEART RATE: 74 BPM | BODY MASS INDEX: 26.95 KG/M2

## 2025-05-15 DIAGNOSIS — F98.8 ATTENTION DEFICIT DISORDER (ADD) WITHOUT HYPERACTIVITY: Primary | ICD-10-CM

## 2025-05-15 DIAGNOSIS — E29.1 HYPOGONADISM IN MALE: ICD-10-CM

## 2025-05-15 DIAGNOSIS — M65.341 TRIGGER RING FINGER OF RIGHT HAND: ICD-10-CM

## 2025-05-15 RX ORDER — TESTOSTERONE CYPIONATE 200 MG/ML
200 INJECTION, SOLUTION INTRAMUSCULAR
Qty: 10 ML | Refills: 1 | Status: SHIPPED | OUTPATIENT
Start: 2025-05-15

## 2025-05-15 RX ORDER — DEXTROAMPHETAMINE SACCHARATE, AMPHETAMINE ASPARTATE, DEXTROAMPHETAMINE SULFATE AND AMPHETAMINE SULFATE 5; 5; 5; 5 MG/1; MG/1; MG/1; MG/1
TABLET ORAL
Qty: 90 TABLET | Refills: 0 | Status: SHIPPED | OUTPATIENT
Start: 2025-05-15

## 2025-08-11 ENCOUNTER — PATIENT MESSAGE (OUTPATIENT)
Dept: PRIMARY CARE CLINIC | Facility: CLINIC | Age: 55
End: 2025-08-11
Payer: COMMERCIAL

## 2025-08-11 DIAGNOSIS — E29.1 HYPOGONADISM IN MALE: ICD-10-CM

## 2025-08-11 DIAGNOSIS — Z00.00 WELLNESS EXAMINATION: Primary | ICD-10-CM

## 2025-08-20 ENCOUNTER — TELEPHONE (OUTPATIENT)
Dept: PRIMARY CARE CLINIC | Facility: CLINIC | Age: 55
End: 2025-08-20
Payer: COMMERCIAL

## 2025-08-25 DIAGNOSIS — E29.1 HYPOGONADISM IN MALE: ICD-10-CM

## 2025-08-25 RX ORDER — TESTOSTERONE CYPIONATE 200 MG/ML
200 INJECTION, SOLUTION INTRAMUSCULAR
Qty: 10 ML | Refills: 1 | Status: SHIPPED | OUTPATIENT
Start: 2025-08-25

## 2025-08-26 ENCOUNTER — LAB VISIT (OUTPATIENT)
Dept: LAB | Facility: HOSPITAL | Age: 55
End: 2025-08-26
Attending: FAMILY MEDICINE
Payer: COMMERCIAL

## 2025-08-28 ENCOUNTER — OFFICE VISIT (OUTPATIENT)
Dept: PRIMARY CARE CLINIC | Facility: CLINIC | Age: 55
End: 2025-08-28
Payer: COMMERCIAL

## 2025-08-28 VITALS
HEART RATE: 69 BPM | OXYGEN SATURATION: 97 % | DIASTOLIC BLOOD PRESSURE: 71 MMHG | HEIGHT: 66 IN | WEIGHT: 164 LBS | BODY MASS INDEX: 26.36 KG/M2 | SYSTOLIC BLOOD PRESSURE: 112 MMHG | TEMPERATURE: 98 F

## 2025-08-28 DIAGNOSIS — Z12.5 SCREENING PSA (PROSTATE SPECIFIC ANTIGEN): ICD-10-CM

## 2025-08-28 DIAGNOSIS — Z00.00 ENCOUNTER FOR WELLNESS EXAMINATION IN ADULT: Primary | ICD-10-CM

## 2025-08-28 DIAGNOSIS — R97.20 PSA ELEVATION: ICD-10-CM

## 2025-08-28 DIAGNOSIS — N52.9 ERECTILE DYSFUNCTION, UNSPECIFIED ERECTILE DYSFUNCTION TYPE: ICD-10-CM

## 2025-08-28 DIAGNOSIS — E29.1 HYPOGONADISM IN MALE: ICD-10-CM

## 2025-08-28 DIAGNOSIS — F98.8 ATTENTION DEFICIT DISORDER (ADD) WITHOUT HYPERACTIVITY: ICD-10-CM

## 2025-08-28 DIAGNOSIS — Z28.21 IMMUNIZATION DECLINED: ICD-10-CM

## 2025-08-28 RX ORDER — DEXTROAMPHETAMINE SACCHARATE, AMPHETAMINE ASPARTATE, DEXTROAMPHETAMINE SULFATE AND AMPHETAMINE SULFATE 5; 5; 5; 5 MG/1; MG/1; MG/1; MG/1
TABLET ORAL
Qty: 90 TABLET | Refills: 0 | Status: SHIPPED | OUTPATIENT
Start: 2025-08-28